# Patient Record
Sex: MALE | Race: WHITE | Employment: OTHER | URBAN - NONMETROPOLITAN AREA
[De-identification: names, ages, dates, MRNs, and addresses within clinical notes are randomized per-mention and may not be internally consistent; named-entity substitution may affect disease eponyms.]

---

## 2017-03-27 ENCOUNTER — HOSPITAL ENCOUNTER (OUTPATIENT)
Dept: PHARMACY | Age: 77
Setting detail: THERAPIES SERIES
Discharge: HOME | End: 2017-03-27
Payer: MEDICARE

## 2017-03-27 VITALS — SYSTOLIC BLOOD PRESSURE: 119 MMHG | HEART RATE: 59 BPM | DIASTOLIC BLOOD PRESSURE: 75 MMHG

## 2017-03-27 DIAGNOSIS — Z79.01 LONG-TERM (CURRENT) USE OF ANTICOAGULANTS: ICD-10-CM

## 2017-03-27 DIAGNOSIS — I48.91 ATRIAL FIBRILLATION, UNSPECIFIED TYPE (HCC): ICD-10-CM

## 2017-03-27 LAB — INR BLD: 2.3

## 2017-03-27 PROCEDURE — 85610 PROTHROMBIN TIME: CPT | Performed by: FAMILY MEDICINE

## 2017-03-27 PROCEDURE — G0463 HOSPITAL OUTPT CLINIC VISIT: HCPCS | Performed by: FAMILY MEDICINE

## 2017-04-26 ENCOUNTER — HOSPITAL ENCOUNTER (OUTPATIENT)
Dept: PHARMACY | Age: 77
Setting detail: THERAPIES SERIES
Discharge: HOME | End: 2017-04-26
Payer: MEDICARE

## 2017-04-26 VITALS
BODY MASS INDEX: 24.14 KG/M2 | DIASTOLIC BLOOD PRESSURE: 66 MMHG | SYSTOLIC BLOOD PRESSURE: 126 MMHG | WEIGHT: 183 LBS | HEART RATE: 51 BPM

## 2017-04-26 DIAGNOSIS — Z79.01 LONG-TERM (CURRENT) USE OF ANTICOAGULANTS: ICD-10-CM

## 2017-04-26 LAB — INR BLD: 1.9

## 2017-04-26 PROCEDURE — 85610 PROTHROMBIN TIME: CPT

## 2017-04-26 PROCEDURE — 99211 OFF/OP EST MAY X REQ PHY/QHP: CPT

## 2017-05-24 ENCOUNTER — APPOINTMENT (OUTPATIENT)
Dept: PHARMACY | Age: 77
End: 2017-05-24
Payer: MEDICARE

## 2017-05-31 ENCOUNTER — HOSPITAL ENCOUNTER (OUTPATIENT)
Dept: PHARMACY | Age: 77
Setting detail: THERAPIES SERIES
Discharge: HOME | End: 2017-05-31
Payer: MEDICARE

## 2017-05-31 VITALS
SYSTOLIC BLOOD PRESSURE: 112 MMHG | HEART RATE: 69 BPM | BODY MASS INDEX: 24.46 KG/M2 | DIASTOLIC BLOOD PRESSURE: 67 MMHG | WEIGHT: 185.4 LBS

## 2017-05-31 DIAGNOSIS — Z79.01 LONG-TERM (CURRENT) USE OF ANTICOAGULANTS: ICD-10-CM

## 2017-05-31 LAB — INR BLD: 2.3

## 2017-05-31 PROCEDURE — 99211 OFF/OP EST MAY X REQ PHY/QHP: CPT

## 2017-05-31 PROCEDURE — 85610 PROTHROMBIN TIME: CPT

## 2017-06-28 ENCOUNTER — HOSPITAL ENCOUNTER (OUTPATIENT)
Dept: PHARMACY | Age: 77
Setting detail: THERAPIES SERIES
Discharge: HOME | End: 2017-06-28
Payer: MEDICARE

## 2017-06-28 VITALS
HEART RATE: 68 BPM | SYSTOLIC BLOOD PRESSURE: 119 MMHG | WEIGHT: 185 LBS | BODY MASS INDEX: 24.41 KG/M2 | DIASTOLIC BLOOD PRESSURE: 65 MMHG

## 2017-06-28 DIAGNOSIS — Z79.01 LONG-TERM (CURRENT) USE OF ANTICOAGULANTS: ICD-10-CM

## 2017-06-28 LAB — INR BLD: 2.8

## 2017-06-28 PROCEDURE — 85610 PROTHROMBIN TIME: CPT

## 2017-06-28 PROCEDURE — 99211 OFF/OP EST MAY X REQ PHY/QHP: CPT

## 2017-08-09 ENCOUNTER — HOSPITAL ENCOUNTER (OUTPATIENT)
Dept: PHARMACY | Age: 77
Setting detail: THERAPIES SERIES
Discharge: HOME | End: 2017-08-09
Payer: MEDICARE

## 2017-08-09 VITALS
WEIGHT: 181 LBS | SYSTOLIC BLOOD PRESSURE: 112 MMHG | BODY MASS INDEX: 23.88 KG/M2 | DIASTOLIC BLOOD PRESSURE: 60 MMHG | HEART RATE: 56 BPM

## 2017-08-09 DIAGNOSIS — Z79.01 LONG-TERM (CURRENT) USE OF ANTICOAGULANTS: ICD-10-CM

## 2017-08-09 LAB — INR BLD: 2.4

## 2017-08-09 PROCEDURE — 99211 OFF/OP EST MAY X REQ PHY/QHP: CPT

## 2017-08-09 PROCEDURE — 85610 PROTHROMBIN TIME: CPT

## 2017-09-20 ENCOUNTER — HOSPITAL ENCOUNTER (OUTPATIENT)
Dept: PHARMACY | Age: 77
Setting detail: THERAPIES SERIES
Discharge: HOME | End: 2017-09-20
Payer: MEDICARE

## 2017-09-20 VITALS
DIASTOLIC BLOOD PRESSURE: 72 MMHG | HEART RATE: 79 BPM | WEIGHT: 182 LBS | BODY MASS INDEX: 24.01 KG/M2 | SYSTOLIC BLOOD PRESSURE: 130 MMHG

## 2017-09-20 DIAGNOSIS — Z79.01 LONG-TERM (CURRENT) USE OF ANTICOAGULANTS: ICD-10-CM

## 2017-09-20 LAB — INR BLD: 1.9

## 2017-09-20 PROCEDURE — 85610 PROTHROMBIN TIME: CPT

## 2017-09-20 PROCEDURE — 99211 OFF/OP EST MAY X REQ PHY/QHP: CPT

## 2017-11-01 ENCOUNTER — HOSPITAL ENCOUNTER (OUTPATIENT)
Dept: PHARMACY | Age: 77
Setting detail: THERAPIES SERIES
Discharge: HOME | End: 2017-11-01
Payer: MEDICARE

## 2017-11-01 VITALS
SYSTOLIC BLOOD PRESSURE: 118 MMHG | HEART RATE: 63 BPM | DIASTOLIC BLOOD PRESSURE: 70 MMHG | BODY MASS INDEX: 24.14 KG/M2 | WEIGHT: 183 LBS

## 2017-11-01 DIAGNOSIS — Z79.01 LONG-TERM (CURRENT) USE OF ANTICOAGULANTS: ICD-10-CM

## 2017-11-01 LAB — INR BLD: 2.3

## 2017-11-01 PROCEDURE — 99211 OFF/OP EST MAY X REQ PHY/QHP: CPT

## 2017-11-01 PROCEDURE — 85610 PROTHROMBIN TIME: CPT

## 2017-11-01 NOTE — PATIENT INSTRUCTIONS
Continue to monitor urine and stool. Continue to monitor for signs of bleeding. Return to clinic in 6 weeks.

## 2017-12-13 ENCOUNTER — HOSPITAL ENCOUNTER (OUTPATIENT)
Dept: PHARMACY | Age: 77
Setting detail: THERAPIES SERIES
Discharge: HOME | End: 2017-12-13
Payer: MEDICARE

## 2017-12-13 VITALS
BODY MASS INDEX: 23.62 KG/M2 | HEART RATE: 73 BPM | WEIGHT: 179 LBS | SYSTOLIC BLOOD PRESSURE: 117 MMHG | DIASTOLIC BLOOD PRESSURE: 88 MMHG

## 2017-12-13 DIAGNOSIS — Z79.01 LONG-TERM (CURRENT) USE OF ANTICOAGULANTS: ICD-10-CM

## 2017-12-13 LAB — INR BLD: 1.9

## 2017-12-13 PROCEDURE — 85610 PROTHROMBIN TIME: CPT

## 2017-12-13 PROCEDURE — 99211 OFF/OP EST MAY X REQ PHY/QHP: CPT

## 2017-12-13 NOTE — PATIENT INSTRUCTIONS
Continue to monitor urine and stool. Continue to monitor for signs of bleeding. Return to clinic in 6 weeks. Take 8 mg of warfarin today.

## 2018-01-26 ENCOUNTER — HOSPITAL ENCOUNTER (OUTPATIENT)
Dept: PHARMACY | Age: 78
Setting detail: THERAPIES SERIES
Discharge: HOME | End: 2018-01-26
Payer: MEDICARE

## 2018-01-26 VITALS
WEIGHT: 185 LBS | DIASTOLIC BLOOD PRESSURE: 74 MMHG | SYSTOLIC BLOOD PRESSURE: 105 MMHG | BODY MASS INDEX: 24.41 KG/M2 | HEART RATE: 67 BPM

## 2018-01-26 DIAGNOSIS — Z79.01 LONG-TERM (CURRENT) USE OF ANTICOAGULANTS: ICD-10-CM

## 2018-01-26 LAB — INR BLD: 2.3

## 2018-01-26 PROCEDURE — 85610 PROTHROMBIN TIME: CPT

## 2018-01-26 PROCEDURE — 99211 OFF/OP EST MAY X REQ PHY/QHP: CPT

## 2018-03-01 ENCOUNTER — HOSPITAL ENCOUNTER (OUTPATIENT)
Dept: PHARMACY | Age: 78
Setting detail: THERAPIES SERIES
Discharge: HOME | End: 2018-03-01
Payer: MEDICARE

## 2018-03-01 VITALS
BODY MASS INDEX: 24.22 KG/M2 | WEIGHT: 183.6 LBS | HEART RATE: 61 BPM | DIASTOLIC BLOOD PRESSURE: 80 MMHG | SYSTOLIC BLOOD PRESSURE: 116 MMHG

## 2018-03-01 DIAGNOSIS — Z79.01 LONG-TERM (CURRENT) USE OF ANTICOAGULANTS: ICD-10-CM

## 2018-03-01 DIAGNOSIS — I48.91 ATRIAL FIBRILLATION, UNSPECIFIED TYPE (HCC): ICD-10-CM

## 2018-03-01 LAB — INR BLD: 1.8

## 2018-03-01 PROCEDURE — 99211 OFF/OP EST MAY X REQ PHY/QHP: CPT | Performed by: FAMILY MEDICINE

## 2018-03-01 PROCEDURE — 85610 PROTHROMBIN TIME: CPT | Performed by: FAMILY MEDICINE

## 2018-03-01 NOTE — PATIENT INSTRUCTIONS
Please take 10mg wafarin today then return to your previously stable dosing. Continue to monitor for signs of bleeding. Return to coumadin clinic in 6 weeks.

## 2018-03-01 NOTE — PROGRESS NOTES
Fingerstick INR drawn per clinic protocol. Patient states no visible blood in urine and no black tarry stool. Denies any missed doses of warfarin. No change in other maintenance medications or in diet. Will continue current warfarin regimen and recheck INR in 6 week(s). Patient missed 2 doses about 2 weeks ago for a tooth extraction. He was instructed by Dr Lizette Green to hold medication evening prior and cut is dose the night of the procedure. Patient will take warfarin 10mg to day then resume previously stable dosing. Patient is leaving for 2 weeks in [place] tomorrow.

## 2018-04-11 ENCOUNTER — HOSPITAL ENCOUNTER (OUTPATIENT)
Dept: PHARMACY | Age: 78
Setting detail: THERAPIES SERIES
Discharge: HOME | End: 2018-04-11
Payer: MEDICARE

## 2018-04-11 DIAGNOSIS — I48.91 ATRIAL FIBRILLATION, UNSPECIFIED TYPE (HCC): ICD-10-CM

## 2018-04-11 DIAGNOSIS — Z79.01 LONG-TERM (CURRENT) USE OF ANTICOAGULANTS: ICD-10-CM

## 2018-04-11 LAB — INR BLD: 2.1

## 2018-04-11 PROCEDURE — 99211 OFF/OP EST MAY X REQ PHY/QHP: CPT | Performed by: FAMILY MEDICINE

## 2018-04-11 PROCEDURE — 85610 PROTHROMBIN TIME: CPT | Performed by: FAMILY MEDICINE

## 2018-05-24 ENCOUNTER — HOSPITAL ENCOUNTER (OUTPATIENT)
Dept: PHARMACY | Age: 78
Setting detail: THERAPIES SERIES
Discharge: HOME | End: 2018-05-24
Payer: MEDICARE

## 2018-05-24 VITALS
HEART RATE: 78 BPM | BODY MASS INDEX: 24.14 KG/M2 | WEIGHT: 183 LBS | DIASTOLIC BLOOD PRESSURE: 71 MMHG | SYSTOLIC BLOOD PRESSURE: 119 MMHG

## 2018-05-24 DIAGNOSIS — I48.91 ATRIAL FIBRILLATION, UNSPECIFIED TYPE (HCC): ICD-10-CM

## 2018-05-24 DIAGNOSIS — Z79.01 LONG-TERM (CURRENT) USE OF ANTICOAGULANTS: ICD-10-CM

## 2018-05-24 LAB — INR BLD: 2

## 2018-05-24 PROCEDURE — 85610 PROTHROMBIN TIME: CPT

## 2018-05-24 PROCEDURE — 99211 OFF/OP EST MAY X REQ PHY/QHP: CPT

## 2018-06-28 ENCOUNTER — HOSPITAL ENCOUNTER (OUTPATIENT)
Dept: PHARMACY | Age: 78
Setting detail: THERAPIES SERIES
Discharge: HOME | End: 2018-06-28
Payer: MEDICARE

## 2018-06-28 VITALS — HEART RATE: 56 BPM | DIASTOLIC BLOOD PRESSURE: 74 MMHG | SYSTOLIC BLOOD PRESSURE: 122 MMHG

## 2018-06-28 DIAGNOSIS — I48.91 ATRIAL FIBRILLATION, UNSPECIFIED TYPE (HCC): ICD-10-CM

## 2018-06-28 DIAGNOSIS — Z79.01 LONG-TERM (CURRENT) USE OF ANTICOAGULANTS: ICD-10-CM

## 2018-06-28 LAB — INR BLD: 2

## 2018-06-28 PROCEDURE — 85610 PROTHROMBIN TIME: CPT

## 2018-06-28 PROCEDURE — 99211 OFF/OP EST MAY X REQ PHY/QHP: CPT

## 2018-07-26 ENCOUNTER — HOSPITAL ENCOUNTER (OUTPATIENT)
Dept: CARDIAC REHAB | Age: 78
Setting detail: THERAPIES SERIES
Discharge: HOME | End: 2018-07-26
Payer: MEDICARE

## 2018-08-01 ENCOUNTER — HOSPITAL ENCOUNTER (OUTPATIENT)
Age: 78
Discharge: HOME | End: 2018-08-03
Payer: MEDICARE

## 2018-08-01 ENCOUNTER — HOSPITAL ENCOUNTER (OUTPATIENT)
Dept: GENERAL RADIOLOGY | Age: 78
Discharge: HOME | End: 2018-08-03
Payer: MEDICARE

## 2018-08-01 DIAGNOSIS — J30.9 ALLERGIC RHINITIS, UNSPECIFIED CHRONICITY, UNSPECIFIED SEASONALITY, UNSPECIFIED TRIGGER: ICD-10-CM

## 2018-08-01 PROCEDURE — 71046 X-RAY EXAM CHEST 2 VIEWS: CPT

## 2018-08-02 ENCOUNTER — HOSPITAL ENCOUNTER (OUTPATIENT)
Dept: CARDIAC REHAB | Age: 78
Setting detail: THERAPIES SERIES
Discharge: HOME | End: 2018-08-02
Payer: MEDICARE

## 2018-08-06 ENCOUNTER — HOSPITAL ENCOUNTER (OUTPATIENT)
Dept: CARDIAC REHAB | Age: 78
Setting detail: THERAPIES SERIES
Discharge: HOME | End: 2018-08-06
Payer: MEDICARE

## 2018-08-08 ENCOUNTER — HOSPITAL ENCOUNTER (OUTPATIENT)
Dept: CARDIAC REHAB | Age: 78
Setting detail: THERAPIES SERIES
Discharge: HOME | End: 2018-08-08
Payer: MEDICARE

## 2018-08-09 ENCOUNTER — HOSPITAL ENCOUNTER (OUTPATIENT)
Dept: PHARMACY | Age: 78
Setting detail: THERAPIES SERIES
Discharge: HOME | End: 2018-08-09
Payer: MEDICARE

## 2018-08-09 ENCOUNTER — HOSPITAL ENCOUNTER (OUTPATIENT)
Dept: CARDIAC REHAB | Age: 78
Setting detail: THERAPIES SERIES
Discharge: HOME | End: 2018-08-09
Payer: MEDICARE

## 2018-08-09 VITALS
BODY MASS INDEX: 23.75 KG/M2 | HEART RATE: 72 BPM | SYSTOLIC BLOOD PRESSURE: 144 MMHG | DIASTOLIC BLOOD PRESSURE: 83 MMHG | WEIGHT: 180 LBS

## 2018-08-09 DIAGNOSIS — Z79.01 LONG TERM CURRENT USE OF ANTICOAGULANT THERAPY: ICD-10-CM

## 2018-08-09 LAB — INR BLD: 1.8

## 2018-08-09 PROCEDURE — 85610 PROTHROMBIN TIME: CPT

## 2018-08-09 PROCEDURE — 99211 OFF/OP EST MAY X REQ PHY/QHP: CPT

## 2018-08-20 ENCOUNTER — HOSPITAL ENCOUNTER (OUTPATIENT)
Dept: CARDIAC REHAB | Age: 78
Setting detail: THERAPIES SERIES
Discharge: HOME | End: 2018-08-20
Payer: MEDICARE

## 2018-08-21 ENCOUNTER — HOSPITAL ENCOUNTER (OUTPATIENT)
Dept: PHARMACY | Age: 78
Setting detail: THERAPIES SERIES
Discharge: HOME | End: 2018-08-21
Payer: MEDICARE

## 2018-08-21 VITALS
BODY MASS INDEX: 24.17 KG/M2 | DIASTOLIC BLOOD PRESSURE: 75 MMHG | HEART RATE: 64 BPM | WEIGHT: 183.2 LBS | SYSTOLIC BLOOD PRESSURE: 130 MMHG

## 2018-08-21 DIAGNOSIS — I48.20 CHRONIC ATRIAL FIBRILLATION (HCC): ICD-10-CM

## 2018-08-21 DIAGNOSIS — Z79.01 LONG TERM CURRENT USE OF ANTICOAGULANT THERAPY: ICD-10-CM

## 2018-08-21 LAB — INR BLD: 2.3

## 2018-08-21 PROCEDURE — 85610 PROTHROMBIN TIME: CPT

## 2018-08-21 PROCEDURE — 99211 OFF/OP EST MAY X REQ PHY/QHP: CPT

## 2018-08-21 NOTE — PATIENT INSTRUCTIONS
Continue current dose of warfarin as instructed on dosing calendar provided. Continue to monitor urine and stool for signs and symptoms of bleeding. Please notify the clinic of any medication changes.

## 2018-08-22 ENCOUNTER — HOSPITAL ENCOUNTER (OUTPATIENT)
Dept: CARDIAC REHAB | Age: 78
Setting detail: THERAPIES SERIES
Discharge: HOME | End: 2018-08-22
Payer: MEDICARE

## 2018-08-27 ENCOUNTER — HOSPITAL ENCOUNTER (OUTPATIENT)
Dept: CARDIAC REHAB | Age: 78
Setting detail: THERAPIES SERIES
Discharge: HOME | End: 2018-08-27
Payer: MEDICARE

## 2018-08-28 ENCOUNTER — APPOINTMENT (OUTPATIENT)
Dept: PHARMACY | Age: 78
End: 2018-08-28
Payer: MEDICARE

## 2018-08-29 ENCOUNTER — HOSPITAL ENCOUNTER (OUTPATIENT)
Dept: PHARMACY | Age: 78
Setting detail: THERAPIES SERIES
Discharge: HOME | End: 2018-08-29
Payer: MEDICARE

## 2018-08-29 ENCOUNTER — HOSPITAL ENCOUNTER (OUTPATIENT)
Dept: CARDIAC REHAB | Age: 78
Setting detail: THERAPIES SERIES
Discharge: HOME | End: 2018-08-29
Payer: MEDICARE

## 2018-08-29 VITALS
WEIGHT: 181 LBS | BODY MASS INDEX: 23.88 KG/M2 | SYSTOLIC BLOOD PRESSURE: 151 MMHG | HEART RATE: 73 BPM | DIASTOLIC BLOOD PRESSURE: 79 MMHG

## 2018-08-29 DIAGNOSIS — Z79.01 LONG TERM CURRENT USE OF ANTICOAGULANT THERAPY: ICD-10-CM

## 2018-08-29 LAB — INR BLD: 3

## 2018-08-29 PROCEDURE — 85610 PROTHROMBIN TIME: CPT

## 2018-08-29 PROCEDURE — 99211 OFF/OP EST MAY X REQ PHY/QHP: CPT

## 2018-08-29 NOTE — PROGRESS NOTES
Patient states no visible blood in urine and no black tarry stool. No change in other medications. Will return to clinic in 1 week. Request for today's INR to be faxed to Dr. Mary Jo Garibay. Will continue current warfarin regimen and recheck INR in 1 week.   LINN and cardioversion tomorrow with Dr Ary Saunders  INR result faxed to Dr. Mary Jo Garibay at [phone].

## 2018-09-05 ENCOUNTER — HOSPITAL ENCOUNTER (OUTPATIENT)
Dept: PHARMACY | Age: 78
Setting detail: THERAPIES SERIES
Discharge: HOME | End: 2018-09-05
Payer: MEDICARE

## 2018-09-05 VITALS
SYSTOLIC BLOOD PRESSURE: 117 MMHG | WEIGHT: 183 LBS | DIASTOLIC BLOOD PRESSURE: 67 MMHG | HEART RATE: 114 BPM | BODY MASS INDEX: 24.14 KG/M2

## 2018-09-05 DIAGNOSIS — Z79.01 LONG TERM CURRENT USE OF ANTICOAGULANT THERAPY: ICD-10-CM

## 2018-09-05 LAB — INR BLD: 2.9

## 2018-09-05 PROCEDURE — 85610 PROTHROMBIN TIME: CPT

## 2018-09-05 PROCEDURE — 99211 OFF/OP EST MAY X REQ PHY/QHP: CPT

## 2018-09-05 NOTE — PROGRESS NOTES
Patient states no visible blood in urine and no black tarry stool. No change in other medications. Will return to clinic in 1 week. Patient started metoprolol 25 mg po BID. Patient went for cardioversion on 8/30/18 and ended up with pacemaker.

## 2018-09-06 ENCOUNTER — APPOINTMENT (OUTPATIENT)
Dept: PHARMACY | Age: 78
End: 2018-09-06
Payer: MEDICARE

## 2018-09-13 ENCOUNTER — HOSPITAL ENCOUNTER (OUTPATIENT)
Dept: PHARMACY | Age: 78
Setting detail: THERAPIES SERIES
Discharge: HOME | End: 2018-09-13
Payer: MEDICARE

## 2018-09-13 ENCOUNTER — HOSPITAL ENCOUNTER (OUTPATIENT)
Dept: CARDIAC REHAB | Age: 78
Setting detail: THERAPIES SERIES
Discharge: HOME | End: 2018-09-13
Payer: MEDICARE

## 2018-09-13 VITALS — DIASTOLIC BLOOD PRESSURE: 78 MMHG | HEART RATE: 62 BPM | SYSTOLIC BLOOD PRESSURE: 123 MMHG

## 2018-09-13 DIAGNOSIS — Z79.01 LONG TERM CURRENT USE OF ANTICOAGULANT THERAPY: ICD-10-CM

## 2018-09-13 DIAGNOSIS — I48.91 ATRIAL FIBRILLATION, UNSPECIFIED TYPE (HCC): ICD-10-CM

## 2018-09-13 LAB — INR BLD: 3.9

## 2018-09-13 PROCEDURE — 85610 PROTHROMBIN TIME: CPT | Performed by: FAMILY MEDICINE

## 2018-09-13 PROCEDURE — 99212 OFFICE O/P EST SF 10 MIN: CPT | Performed by: FAMILY MEDICINE

## 2018-09-13 NOTE — PATIENT INSTRUCTIONS
Do not take warfarin today then decrease your weekly dosage as instructed. Continue to monitor for signs of bleeding. Return to coumadin clinic in 1 week.

## 2018-09-24 ENCOUNTER — HOSPITAL ENCOUNTER (OUTPATIENT)
Dept: PHARMACY | Age: 78
Setting detail: THERAPIES SERIES
Discharge: HOME | End: 2018-09-24
Payer: MEDICARE

## 2018-09-24 VITALS — SYSTOLIC BLOOD PRESSURE: 126 MMHG | HEART RATE: 67 BPM | DIASTOLIC BLOOD PRESSURE: 69 MMHG

## 2018-09-24 DIAGNOSIS — I48.20 CHRONIC ATRIAL FIBRILLATION (HCC): ICD-10-CM

## 2018-09-24 DIAGNOSIS — Z79.01 LONG TERM CURRENT USE OF ANTICOAGULANT THERAPY: ICD-10-CM

## 2018-09-24 LAB — INR BLD: 2.4

## 2018-09-24 PROCEDURE — 85610 PROTHROMBIN TIME: CPT

## 2018-09-24 PROCEDURE — 99211 OFF/OP EST MAY X REQ PHY/QHP: CPT

## 2018-09-24 NOTE — PROGRESS NOTES
Fingerstick INR drawn per clinic protocol. Patient states no visible blood in urine and no black tarry stool. Denies any missed doses of warfarin. No change in other maintenance medications or in diet. Patient states he had cardioversion 8/30/18 that lasted 90 minutes, and he ended up with a pacemaker. Patient has appointment with cardiologist on Wednesday, 9/26/18, and will discuss a-fib treatment options. Will continue current warfarin regimen and recheck INR in 2 week(s). Patient acknowledges working in consult agreement with pharmacist as referred by his/her physician.

## 2018-10-10 ENCOUNTER — HOSPITAL ENCOUNTER (OUTPATIENT)
Dept: PHARMACY | Age: 78
Setting detail: THERAPIES SERIES
Discharge: HOME | End: 2018-10-10
Payer: MEDICARE

## 2018-10-10 VITALS
DIASTOLIC BLOOD PRESSURE: 79 MMHG | HEART RATE: 74 BPM | BODY MASS INDEX: 24.01 KG/M2 | SYSTOLIC BLOOD PRESSURE: 116 MMHG | WEIGHT: 182 LBS

## 2018-10-10 DIAGNOSIS — Z79.01 LONG TERM CURRENT USE OF ANTICOAGULANT THERAPY: ICD-10-CM

## 2018-10-10 LAB — INR BLD: 1.9

## 2018-10-10 PROCEDURE — 85610 PROTHROMBIN TIME: CPT

## 2018-10-10 PROCEDURE — 99211 OFF/OP EST MAY X REQ PHY/QHP: CPT

## 2018-10-10 RX ORDER — SOTALOL HYDROCHLORIDE 80 MG/1
80 TABLET ORAL DAILY
COMMUNITY
End: 2019-04-19

## 2018-10-17 ENCOUNTER — HOSPITAL ENCOUNTER (OUTPATIENT)
Dept: CARDIAC REHAB | Age: 78
Discharge: HOME | End: 2018-10-17
Payer: MEDICARE

## 2018-10-17 ENCOUNTER — HOSPITAL ENCOUNTER (OUTPATIENT)
Dept: PHARMACY | Age: 78
Setting detail: THERAPIES SERIES
Discharge: HOME | End: 2018-10-17
Payer: MEDICARE

## 2018-10-17 VITALS
HEART RATE: 71 BPM | SYSTOLIC BLOOD PRESSURE: 125 MMHG | WEIGHT: 181 LBS | DIASTOLIC BLOOD PRESSURE: 71 MMHG | BODY MASS INDEX: 23.88 KG/M2

## 2018-10-17 DIAGNOSIS — Z79.01 LONG TERM CURRENT USE OF ANTICOAGULANT THERAPY: ICD-10-CM

## 2018-10-17 LAB — INR BLD: 2

## 2018-10-17 PROCEDURE — 99211 OFF/OP EST MAY X REQ PHY/QHP: CPT

## 2018-10-17 PROCEDURE — 85610 PROTHROMBIN TIME: CPT

## 2018-10-17 NOTE — PATIENT INSTRUCTIONS
Continue to monitor urine and stool. Continue to monitor for signs of bleeding. Return to clinic in 1 week. Increase warfarin dosage slightly to 6 mg M,W,F and 8 mg all other days.

## 2018-10-22 ENCOUNTER — HOSPITAL ENCOUNTER (OUTPATIENT)
Dept: CARDIAC REHAB | Age: 78
Discharge: HOME | End: 2018-10-22
Payer: MEDICARE

## 2018-10-24 ENCOUNTER — APPOINTMENT (OUTPATIENT)
Dept: PHARMACY | Age: 78
End: 2018-10-24
Payer: MEDICARE

## 2018-10-24 ENCOUNTER — HOSPITAL ENCOUNTER (OUTPATIENT)
Dept: CARDIAC REHAB | Age: 78
Discharge: HOME | End: 2018-10-24
Payer: MEDICARE

## 2018-10-24 ENCOUNTER — HOSPITAL ENCOUNTER (OUTPATIENT)
Dept: PHARMACY | Age: 78
Setting detail: THERAPIES SERIES
Discharge: HOME | End: 2018-10-24
Payer: MEDICARE

## 2018-10-24 VITALS
HEART RATE: 66 BPM | DIASTOLIC BLOOD PRESSURE: 69 MMHG | SYSTOLIC BLOOD PRESSURE: 127 MMHG | BODY MASS INDEX: 23.88 KG/M2 | WEIGHT: 181 LBS

## 2018-10-24 DIAGNOSIS — Z79.01 LONG TERM CURRENT USE OF ANTICOAGULANT THERAPY: ICD-10-CM

## 2018-10-24 LAB — INR BLD: 2.4

## 2018-10-24 PROCEDURE — 99211 OFF/OP EST MAY X REQ PHY/QHP: CPT

## 2018-10-24 PROCEDURE — 85610 PROTHROMBIN TIME: CPT

## 2018-10-25 ENCOUNTER — HOSPITAL ENCOUNTER (OUTPATIENT)
Dept: CARDIAC REHAB | Age: 78
Discharge: HOME | End: 2018-10-25
Payer: MEDICARE

## 2018-10-25 PROCEDURE — 9900000059 HC CARDIAC REHAB PHASE 3 - 12 VISITS

## 2018-10-29 ENCOUNTER — HOSPITAL ENCOUNTER (OUTPATIENT)
Dept: CARDIAC REHAB | Age: 78
Discharge: HOME | End: 2018-10-29
Payer: MEDICARE

## 2018-10-31 ENCOUNTER — HOSPITAL ENCOUNTER (OUTPATIENT)
Dept: PHARMACY | Age: 78
Setting detail: THERAPIES SERIES
Discharge: HOME | End: 2018-10-31
Payer: MEDICARE

## 2018-10-31 ENCOUNTER — HOSPITAL ENCOUNTER (OUTPATIENT)
Dept: CARDIAC REHAB | Age: 78
Discharge: HOME | End: 2018-10-31
Payer: MEDICARE

## 2018-10-31 VITALS — WEIGHT: 182.2 LBS | BODY MASS INDEX: 24.04 KG/M2

## 2018-10-31 DIAGNOSIS — Z79.01 LONG TERM CURRENT USE OF ANTICOAGULANT THERAPY: ICD-10-CM

## 2018-10-31 DIAGNOSIS — I48.91 ATRIAL FIBRILLATION, UNSPECIFIED TYPE (HCC): ICD-10-CM

## 2018-10-31 LAB — INR BLD: 2.3

## 2018-10-31 PROCEDURE — 99211 OFF/OP EST MAY X REQ PHY/QHP: CPT | Performed by: FAMILY MEDICINE

## 2018-10-31 PROCEDURE — 85610 PROTHROMBIN TIME: CPT | Performed by: FAMILY MEDICINE

## 2018-11-01 ENCOUNTER — HOSPITAL ENCOUNTER (OUTPATIENT)
Dept: CARDIAC REHAB | Age: 78
Discharge: HOME | End: 2018-11-01
Payer: MEDICARE

## 2018-11-05 ENCOUNTER — HOSPITAL ENCOUNTER (OUTPATIENT)
Dept: CARDIAC REHAB | Age: 78
Discharge: HOME | End: 2018-11-05
Payer: MEDICARE

## 2018-11-07 ENCOUNTER — HOSPITAL ENCOUNTER (OUTPATIENT)
Dept: CARDIAC REHAB | Age: 78
Discharge: HOME | End: 2018-11-07
Payer: MEDICARE

## 2018-11-07 ENCOUNTER — HOSPITAL ENCOUNTER (OUTPATIENT)
Dept: PHARMACY | Age: 78
Setting detail: THERAPIES SERIES
Discharge: HOME | End: 2018-11-07
Payer: MEDICARE

## 2018-11-07 VITALS
WEIGHT: 184 LBS | BODY MASS INDEX: 24.28 KG/M2 | SYSTOLIC BLOOD PRESSURE: 118 MMHG | DIASTOLIC BLOOD PRESSURE: 82 MMHG | HEART RATE: 71 BPM

## 2018-11-07 DIAGNOSIS — Z79.01 LONG TERM CURRENT USE OF ANTICOAGULANT THERAPY: ICD-10-CM

## 2018-11-07 DIAGNOSIS — I48.91 ATRIAL FIBRILLATION, UNSPECIFIED TYPE (HCC): ICD-10-CM

## 2018-11-07 LAB — INR BLD: 2

## 2018-11-07 PROCEDURE — 85610 PROTHROMBIN TIME: CPT | Performed by: FAMILY MEDICINE

## 2018-11-07 PROCEDURE — 99211 OFF/OP EST MAY X REQ PHY/QHP: CPT | Performed by: FAMILY MEDICINE

## 2018-11-12 ENCOUNTER — HOSPITAL ENCOUNTER (OUTPATIENT)
Dept: CARDIAC REHAB | Age: 78
Discharge: HOME | End: 2018-11-12
Payer: MEDICARE

## 2018-11-14 ENCOUNTER — HOSPITAL ENCOUNTER (OUTPATIENT)
Age: 78
Discharge: HOME | End: 2018-11-16
Payer: MEDICARE

## 2018-11-14 ENCOUNTER — HOSPITAL ENCOUNTER (OUTPATIENT)
Dept: CARDIAC REHAB | Age: 78
Discharge: HOME | End: 2018-11-14
Payer: MEDICARE

## 2018-11-14 ENCOUNTER — HOSPITAL ENCOUNTER (OUTPATIENT)
Dept: GENERAL RADIOLOGY | Age: 78
Discharge: HOME | End: 2018-11-16
Payer: MEDICARE

## 2018-11-14 DIAGNOSIS — M54.2 CERVICALGIA: ICD-10-CM

## 2018-11-14 PROCEDURE — 72050 X-RAY EXAM NECK SPINE 4/5VWS: CPT

## 2018-11-19 ENCOUNTER — HOSPITAL ENCOUNTER (OUTPATIENT)
Dept: CARDIAC REHAB | Age: 78
Discharge: HOME | End: 2018-11-19
Payer: MEDICARE

## 2018-11-26 ENCOUNTER — HOSPITAL ENCOUNTER (OUTPATIENT)
Dept: CARDIAC REHAB | Age: 78
Discharge: HOME | End: 2018-11-26
Payer: MEDICARE

## 2018-11-28 ENCOUNTER — HOSPITAL ENCOUNTER (OUTPATIENT)
Dept: CARDIAC REHAB | Age: 78
Discharge: HOME | End: 2018-11-28
Payer: MEDICARE

## 2018-11-29 ENCOUNTER — HOSPITAL ENCOUNTER (OUTPATIENT)
Dept: CARDIAC REHAB | Age: 78
Discharge: HOME | End: 2018-11-29
Payer: MEDICARE

## 2018-12-03 ENCOUNTER — HOSPITAL ENCOUNTER (OUTPATIENT)
Dept: CARDIAC REHAB | Age: 78
Discharge: HOME | End: 2018-12-03
Payer: MEDICARE

## 2018-12-05 ENCOUNTER — HOSPITAL ENCOUNTER (OUTPATIENT)
Dept: PHARMACY | Age: 78
Setting detail: THERAPIES SERIES
Discharge: HOME | End: 2018-12-05
Payer: MEDICARE

## 2018-12-05 ENCOUNTER — HOSPITAL ENCOUNTER (OUTPATIENT)
Dept: CARDIAC REHAB | Age: 78
Discharge: HOME | End: 2018-12-05
Payer: MEDICARE

## 2018-12-05 VITALS
BODY MASS INDEX: 24.41 KG/M2 | HEART RATE: 74 BPM | DIASTOLIC BLOOD PRESSURE: 82 MMHG | WEIGHT: 185 LBS | SYSTOLIC BLOOD PRESSURE: 118 MMHG

## 2018-12-05 DIAGNOSIS — Z79.01 LONG TERM CURRENT USE OF ANTICOAGULANT THERAPY: ICD-10-CM

## 2018-12-05 LAB — INR BLD: 2.4

## 2018-12-05 PROCEDURE — 85610 PROTHROMBIN TIME: CPT

## 2018-12-05 PROCEDURE — 99211 OFF/OP EST MAY X REQ PHY/QHP: CPT

## 2018-12-05 NOTE — PROGRESS NOTES
Dr Donita Holstein at [facility] will do ablation on Giuseppe 3, 2019. Patient states no visible blood in urine and no black tarry stool. No change in other medications. Will return to clinic in 2 weeks.

## 2018-12-10 ENCOUNTER — HOSPITAL ENCOUNTER (OUTPATIENT)
Dept: CARDIAC REHAB | Age: 78
Discharge: HOME | End: 2018-12-10
Payer: MEDICARE

## 2018-12-12 ENCOUNTER — HOSPITAL ENCOUNTER (OUTPATIENT)
Dept: CARDIAC REHAB | Age: 78
Discharge: HOME | End: 2018-12-12
Payer: MEDICARE

## 2018-12-17 ENCOUNTER — HOSPITAL ENCOUNTER (OUTPATIENT)
Dept: CARDIAC REHAB | Age: 78
Discharge: HOME | End: 2018-12-17
Payer: MEDICARE

## 2018-12-19 ENCOUNTER — HOSPITAL ENCOUNTER (OUTPATIENT)
Dept: CARDIAC REHAB | Age: 78
Discharge: HOME | End: 2018-12-19
Payer: MEDICARE

## 2018-12-19 ENCOUNTER — TELEPHONE (OUTPATIENT)
Dept: PHARMACY | Age: 78
End: 2018-12-19

## 2018-12-19 ENCOUNTER — HOSPITAL ENCOUNTER (OUTPATIENT)
Dept: PHARMACY | Age: 78
Setting detail: THERAPIES SERIES
Discharge: HOME | End: 2018-12-19
Payer: MEDICARE

## 2018-12-19 VITALS
HEART RATE: 77 BPM | WEIGHT: 186 LBS | DIASTOLIC BLOOD PRESSURE: 82 MMHG | BODY MASS INDEX: 24.54 KG/M2 | SYSTOLIC BLOOD PRESSURE: 134 MMHG

## 2018-12-19 DIAGNOSIS — Z79.01 LONG TERM CURRENT USE OF ANTICOAGULANT THERAPY: ICD-10-CM

## 2018-12-19 LAB — INR BLD: 1.9

## 2018-12-19 PROCEDURE — 85610 PROTHROMBIN TIME: CPT

## 2018-12-19 PROCEDURE — 99211 OFF/OP EST MAY X REQ PHY/QHP: CPT

## 2018-12-20 ENCOUNTER — HOSPITAL ENCOUNTER (OUTPATIENT)
Dept: CARDIAC REHAB | Age: 78
Discharge: HOME | End: 2018-12-20
Payer: MEDICARE

## 2018-12-28 ENCOUNTER — HOSPITAL ENCOUNTER (OUTPATIENT)
Dept: PHARMACY | Age: 78
Setting detail: THERAPIES SERIES
Discharge: HOME | End: 2018-12-28
Payer: MEDICARE

## 2018-12-28 VITALS
SYSTOLIC BLOOD PRESSURE: 138 MMHG | DIASTOLIC BLOOD PRESSURE: 89 MMHG | WEIGHT: 189 LBS | BODY MASS INDEX: 24.94 KG/M2 | HEART RATE: 73 BPM

## 2018-12-28 DIAGNOSIS — Z79.01 LONG TERM CURRENT USE OF ANTICOAGULANT THERAPY: ICD-10-CM

## 2018-12-28 DIAGNOSIS — I48.91 ATRIAL FIBRILLATION, UNSPECIFIED TYPE (HCC): ICD-10-CM

## 2018-12-28 LAB — INR BLD: 1.8

## 2018-12-28 PROCEDURE — 99211 OFF/OP EST MAY X REQ PHY/QHP: CPT

## 2018-12-28 PROCEDURE — 85610 PROTHROMBIN TIME: CPT

## 2018-12-28 NOTE — PROGRESS NOTES
Patient will be having a LINN on 1/2 and an ablation on 1/3. Will have patient take 8 mg of warfarin today and recheck INR on Monday, as scheduled. Patient states no visible blood in urine and no black tarry stool. No change in other medications.

## 2018-12-31 ENCOUNTER — HOSPITAL ENCOUNTER (OUTPATIENT)
Dept: CARDIAC REHAB | Age: 78
Discharge: HOME | End: 2018-12-31
Payer: MEDICARE

## 2018-12-31 ENCOUNTER — HOSPITAL ENCOUNTER (OUTPATIENT)
Dept: PHARMACY | Age: 78
Setting detail: THERAPIES SERIES
Discharge: HOME | End: 2018-12-31
Payer: MEDICARE

## 2018-12-31 VITALS — BODY MASS INDEX: 24.75 KG/M2 | WEIGHT: 187.6 LBS

## 2018-12-31 DIAGNOSIS — Z79.01 LONG TERM CURRENT USE OF ANTICOAGULANT THERAPY: ICD-10-CM

## 2018-12-31 DIAGNOSIS — I48.91 ATRIAL FIBRILLATION, UNSPECIFIED TYPE (HCC): ICD-10-CM

## 2018-12-31 LAB — INR BLD: 2.3

## 2018-12-31 PROCEDURE — 99211 OFF/OP EST MAY X REQ PHY/QHP: CPT | Performed by: FAMILY MEDICINE

## 2018-12-31 PROCEDURE — 85610 PROTHROMBIN TIME: CPT | Performed by: FAMILY MEDICINE

## 2019-01-09 ENCOUNTER — HOSPITAL ENCOUNTER (OUTPATIENT)
Dept: PHARMACY | Age: 79
Setting detail: THERAPIES SERIES
Discharge: HOME | End: 2019-01-09
Payer: MEDICARE

## 2019-01-09 VITALS
WEIGHT: 191 LBS | HEART RATE: 84 BPM | BODY MASS INDEX: 25.2 KG/M2 | SYSTOLIC BLOOD PRESSURE: 137 MMHG | DIASTOLIC BLOOD PRESSURE: 77 MMHG

## 2019-01-09 DIAGNOSIS — Z79.01 LONG TERM CURRENT USE OF ANTICOAGULANT THERAPY: ICD-10-CM

## 2019-01-09 LAB — INR BLD: 2.2

## 2019-01-09 PROCEDURE — 99211 OFF/OP EST MAY X REQ PHY/QHP: CPT

## 2019-01-09 PROCEDURE — 85610 PROTHROMBIN TIME: CPT

## 2019-01-16 ENCOUNTER — HOSPITAL ENCOUNTER (OUTPATIENT)
Dept: CARDIAC REHAB | Age: 79
Setting detail: THERAPIES SERIES
Discharge: HOME | End: 2019-01-16
Payer: MEDICARE

## 2019-01-17 ENCOUNTER — HOSPITAL ENCOUNTER (OUTPATIENT)
Dept: CARDIAC REHAB | Age: 79
Setting detail: THERAPIES SERIES
Discharge: HOME | End: 2019-01-17
Payer: MEDICARE

## 2019-01-23 ENCOUNTER — HOSPITAL ENCOUNTER (OUTPATIENT)
Dept: CARDIAC REHAB | Age: 79
Setting detail: THERAPIES SERIES
Discharge: HOME | End: 2019-01-23
Payer: MEDICARE

## 2019-01-23 ENCOUNTER — HOSPITAL ENCOUNTER (OUTPATIENT)
Dept: PHARMACY | Age: 79
Setting detail: THERAPIES SERIES
Discharge: HOME | End: 2019-01-23
Payer: MEDICARE

## 2019-01-23 DIAGNOSIS — I48.91 ATRIAL FIBRILLATION, UNSPECIFIED TYPE (HCC): ICD-10-CM

## 2019-01-23 DIAGNOSIS — Z79.01 LONG TERM CURRENT USE OF ANTICOAGULANT THERAPY: ICD-10-CM

## 2019-01-23 LAB — INR BLD: 2

## 2019-01-23 PROCEDURE — 85610 PROTHROMBIN TIME: CPT | Performed by: FAMILY MEDICINE

## 2019-01-23 PROCEDURE — 99211 OFF/OP EST MAY X REQ PHY/QHP: CPT | Performed by: FAMILY MEDICINE

## 2019-01-24 ENCOUNTER — HOSPITAL ENCOUNTER (OUTPATIENT)
Dept: CARDIAC REHAB | Age: 79
Setting detail: THERAPIES SERIES
Discharge: HOME | End: 2019-01-24
Payer: MEDICARE

## 2019-01-28 ENCOUNTER — HOSPITAL ENCOUNTER (OUTPATIENT)
Dept: CARDIAC REHAB | Age: 79
Setting detail: THERAPIES SERIES
Discharge: HOME | End: 2019-01-28
Payer: MEDICARE

## 2019-02-04 ENCOUNTER — HOSPITAL ENCOUNTER (OUTPATIENT)
Dept: CARDIAC REHAB | Age: 79
Discharge: HOME | End: 2019-02-04

## 2019-02-04 PROCEDURE — 9900000059 HC CARDIAC REHAB PHASE 3 - 12 VISITS

## 2019-02-06 ENCOUNTER — HOSPITAL ENCOUNTER (OUTPATIENT)
Dept: CARDIAC REHAB | Age: 79
Setting detail: THERAPIES SERIES
Discharge: HOME | End: 2019-02-06

## 2019-02-27 ENCOUNTER — HOSPITAL ENCOUNTER (OUTPATIENT)
Dept: CARDIAC REHAB | Age: 79
Setting detail: THERAPIES SERIES
Discharge: HOME | End: 2019-02-27

## 2019-02-28 ENCOUNTER — HOSPITAL ENCOUNTER (OUTPATIENT)
Dept: CARDIAC REHAB | Age: 79
Setting detail: THERAPIES SERIES
Discharge: HOME | End: 2019-02-28

## 2019-03-01 ENCOUNTER — HOSPITAL ENCOUNTER (OUTPATIENT)
Dept: PHARMACY | Age: 79
Setting detail: THERAPIES SERIES
Discharge: HOME | End: 2019-03-01
Payer: MEDICARE

## 2019-03-01 VITALS
SYSTOLIC BLOOD PRESSURE: 109 MMHG | DIASTOLIC BLOOD PRESSURE: 72 MMHG | BODY MASS INDEX: 24.14 KG/M2 | HEART RATE: 71 BPM | WEIGHT: 183 LBS

## 2019-03-01 DIAGNOSIS — Z79.01 LONG TERM CURRENT USE OF ANTICOAGULANT THERAPY: ICD-10-CM

## 2019-03-01 LAB — INR BLD: 2.4

## 2019-03-01 PROCEDURE — 85610 PROTHROMBIN TIME: CPT

## 2019-03-01 PROCEDURE — 99211 OFF/OP EST MAY X REQ PHY/QHP: CPT

## 2019-03-04 ENCOUNTER — HOSPITAL ENCOUNTER (OUTPATIENT)
Dept: CARDIAC REHAB | Age: 79
Setting detail: THERAPIES SERIES
Discharge: HOME | End: 2019-03-04

## 2019-03-06 ENCOUNTER — HOSPITAL ENCOUNTER (OUTPATIENT)
Dept: CARDIAC REHAB | Age: 79
Setting detail: THERAPIES SERIES
Discharge: HOME | End: 2019-03-06

## 2019-03-07 ENCOUNTER — HOSPITAL ENCOUNTER (OUTPATIENT)
Dept: CARDIAC REHAB | Age: 79
Setting detail: THERAPIES SERIES
Discharge: HOME | End: 2019-03-07

## 2019-03-11 ENCOUNTER — HOSPITAL ENCOUNTER (OUTPATIENT)
Age: 79
Discharge: HOME | End: 2019-03-13
Payer: MEDICARE

## 2019-03-11 ENCOUNTER — HOSPITAL ENCOUNTER (OUTPATIENT)
Dept: CARDIAC REHAB | Age: 79
Setting detail: THERAPIES SERIES
Discharge: HOME | End: 2019-03-11

## 2019-03-11 ENCOUNTER — HOSPITAL ENCOUNTER (OUTPATIENT)
Dept: GENERAL RADIOLOGY | Age: 79
Discharge: HOME | End: 2019-03-13
Payer: MEDICARE

## 2019-03-11 DIAGNOSIS — M54.2 NECK PAIN: ICD-10-CM

## 2019-03-11 PROCEDURE — 72040 X-RAY EXAM NECK SPINE 2-3 VW: CPT

## 2019-03-11 PROCEDURE — 72050 X-RAY EXAM NECK SPINE 4/5VWS: CPT

## 2019-03-14 ENCOUNTER — HOSPITAL ENCOUNTER (OUTPATIENT)
Dept: CARDIAC REHAB | Age: 79
Setting detail: THERAPIES SERIES
Discharge: HOME | End: 2019-03-14

## 2019-03-20 ENCOUNTER — HOSPITAL ENCOUNTER (OUTPATIENT)
Dept: CARDIAC REHAB | Age: 79
Setting detail: THERAPIES SERIES
Discharge: HOME | End: 2019-03-20

## 2019-03-21 ENCOUNTER — HOSPITAL ENCOUNTER (OUTPATIENT)
Dept: CARDIAC REHAB | Age: 79
Setting detail: THERAPIES SERIES
Discharge: HOME | End: 2019-03-21

## 2019-03-25 ENCOUNTER — HOSPITAL ENCOUNTER (OUTPATIENT)
Dept: CARDIAC REHAB | Age: 79
Setting detail: THERAPIES SERIES
Discharge: HOME | End: 2019-03-25

## 2019-04-01 ENCOUNTER — HOSPITAL ENCOUNTER (OUTPATIENT)
Dept: CARDIAC REHAB | Age: 79
Setting detail: THERAPIES SERIES
Discharge: HOME | End: 2019-04-01

## 2019-04-03 ENCOUNTER — HOSPITAL ENCOUNTER (OUTPATIENT)
Dept: CARDIAC REHAB | Age: 79
Setting detail: THERAPIES SERIES
Discharge: HOME | End: 2019-04-03

## 2019-04-04 ENCOUNTER — HOSPITAL ENCOUNTER (OUTPATIENT)
Dept: CARDIAC REHAB | Age: 79
Setting detail: THERAPIES SERIES
Discharge: HOME | End: 2019-04-04

## 2019-04-11 ENCOUNTER — HOSPITAL ENCOUNTER (OUTPATIENT)
Dept: CARDIAC REHAB | Age: 79
Discharge: HOME | End: 2019-04-11

## 2019-04-15 ENCOUNTER — HOSPITAL ENCOUNTER (OUTPATIENT)
Dept: CARDIAC REHAB | Age: 79
Discharge: HOME | End: 2019-04-15

## 2019-04-19 ENCOUNTER — HOSPITAL ENCOUNTER (EMERGENCY)
Age: 79
Discharge: HOME | End: 2019-04-19
Attending: FAMILY MEDICINE
Payer: MEDICARE

## 2019-04-19 VITALS
SYSTOLIC BLOOD PRESSURE: 149 MMHG | RESPIRATION RATE: 16 BRPM | DIASTOLIC BLOOD PRESSURE: 108 MMHG | TEMPERATURE: 97 F | OXYGEN SATURATION: 99 % | HEART RATE: 79 BPM

## 2019-04-19 DIAGNOSIS — S80.11XA CONTUSION OF RIGHT LOWER EXTREMITY, INITIAL ENCOUNTER: Primary | ICD-10-CM

## 2019-04-19 PROCEDURE — 99283 EMERGENCY DEPT VISIT LOW MDM: CPT

## 2019-04-19 ASSESSMENT — PAIN DESCRIPTION - FREQUENCY: FREQUENCY: INTERMITTENT

## 2019-04-19 ASSESSMENT — PAIN SCALES - GENERAL: PAINLEVEL_OUTOF10: 3

## 2019-04-19 ASSESSMENT — PAIN DESCRIPTION - DESCRIPTORS: DESCRIPTORS: ACHING;DISCOMFORT

## 2019-04-19 ASSESSMENT — PAIN DESCRIPTION - ORIENTATION: ORIENTATION: RIGHT

## 2019-04-19 ASSESSMENT — PAIN DESCRIPTION - LOCATION: LOCATION: LEG

## 2019-04-19 ASSESSMENT — PAIN DESCRIPTION - PAIN TYPE: TYPE: ACUTE PAIN

## 2019-04-19 ASSESSMENT — ENCOUNTER SYMPTOMS
GASTROINTESTINAL NEGATIVE: 1
EYES NEGATIVE: 1
RESPIRATORY NEGATIVE: 1

## 2019-04-19 NOTE — ED PROVIDER NOTES
[facility] ED  EMERGENCY DEPARTMENT ENCOUNTER      Pt Name: Mary Fitzgerald  MRN: [id number]  [place] 1940  Date of evaluation: 4/19/2019  Provider: Karla Valadez MD    [address] [place] [address]     Chief Complaint   Patient presents with    Leg Pain     right leg; onset last night; worried about a blood clot         HISTORY OF PRESENT ILLNESS   (Location/Symptom, Timing/Onset, Context/Setting,Quality, Duration, Modifying Factors, Severity)  Note limiting factors. Mary Fitzgerald is [de-identified] y.o. male who presents to the emergency department a nodule on his right shin. He does not recall any trauma. It does not hurt. A nurse friends and felt that it might clot. He came for further evaluation and treatment. No fever chills sweats. The patient is on Coumadin and has had stable INR between 2 and 3 for many many months. Last time it was checked was 2 weeks ago. Nursing Notes were reviewed. Old records reviewed. REVIEW OF SYSTEMS    (2-9 systems for level 4, 10 or more for level 5)     Review of Systems   Constitutional: Negative. HENT: Negative. Eyes: Negative. Respiratory: Negative. Cardiovascular: Negative. Gastrointestinal: Negative. Genitourinary: Negative. Musculoskeletal: Negative. Skin: Negative. Neurological: Negative. Psychiatric/Behavioral: Negative. PAST MEDICAL HISTORY     Past Medical History:   Diagnosis Date    Atrial fibrillation ([address]     Bradycardia     CAD (coronary artery disease) 5/4/12    cabg x 5     S/P CABG x 5          SURGICALHISTORY       Past Surgical History:   Procedure Laterality Date    CORONARY ARTERY BYPASS GRAFT  5/4/12    cabg x 5          CURRENT MEDICATIONS       Previous Medications    ALPRAZOLAM (XANAX) 0.5 MG TABLET    Take 0.5 mg by mouth daily as needed for Sleep    ATORVASTATIN (LIPITOR) 40 MG TABLET    Take 40 mg by mouth daily. CLOPIDOGREL (PLAVIX) 75 MG TABLET    Take 75 mg by mouth daily.       COENZYME Q10 Concern    Not on file   Social History Narrative    Not on file       SCREENINGS    [place] Coma Scale  Eye Opening: Spontaneous  Best Verbal Response: Oriented  Best Motor Response: Obeys commands  [place] Coma Scale Score: 15        PHYSICAL EXAM    (up to 7 for level 4, 8 or more for level 5)     ED Triage Vitals [04/19/19 1224]   BP Temp Temp Source Pulse Resp SpO2 Height Weight   (!) 149/108 97 °F (36.1 °C) Tympanic 79 16 99 % -- --       Physical Exam   Constitutional: He is oriented to person, place, and time. He appears well-developed and well-nourished. HENT:   Head: Normocephalic and atraumatic. Mouth/Throat: Oropharynx is clear and moist.   Eyes: Pupils are equal, round, and reactive to light. EOM are normal.   Neck: Normal range of motion. Neck supple. Pulmonary/Chest: Effort normal. No respiratory distress. Musculoskeletal: Normal range of motion. He exhibits no deformity. There is a 2 mm area of purplish discoloration on the front of the shin with 2 cm diameter area of bogginess underneath. Multiple friable vessels are seen under the skin throughout the legs. Neurological: He is alert and oriented to person, place, and time. Skin: Skin is warm and dry. Psychiatric: He has a normal mood and affect. His behavior is normal.       DIAGNOSTIC RESULTS     EKG: All EKG's are interpreted by the Emergency Department Physician who either signs orCo-signs this chart in the absence of a cardiologist.      RADIOLOGY:   plain film images such as CT, Ultrasound and MRI are read by the radiologist. Plain radiographic images are visualized and preliminarily interpreted by the emergency physician with the below findings:    Interpretation per the Radiologist below, if available at the time of this note:    No orders to display       LABS:  Labs Reviewed - No data to display    All other labs were within normal range or not returned as of this dictation.     EMERGENCY DEPARTMENT COURSE and DIFFERENTIAL

## 2019-04-22 ENCOUNTER — HOSPITAL ENCOUNTER (OUTPATIENT)
Dept: CARDIAC REHAB | Age: 79
Discharge: HOME | End: 2019-04-22

## 2019-04-24 ENCOUNTER — HOSPITAL ENCOUNTER (OUTPATIENT)
Dept: CARDIAC REHAB | Age: 79
Discharge: HOME | End: 2019-04-24

## 2019-04-29 ENCOUNTER — HOSPITAL ENCOUNTER (OUTPATIENT)
Dept: CARDIAC REHAB | Age: 79
Discharge: HOME | End: 2019-04-29

## 2019-05-01 ENCOUNTER — HOSPITAL ENCOUNTER (OUTPATIENT)
Dept: CARDIAC REHAB | Age: 79
Discharge: HOME | End: 2019-05-01

## 2019-05-06 ENCOUNTER — HOSPITAL ENCOUNTER (OUTPATIENT)
Dept: CARDIAC REHAB | Age: 79
Discharge: HOME | End: 2019-05-06

## 2019-05-06 PROCEDURE — 9900000059 HC CARDIAC REHAB PHASE 3 - 12 VISITS

## 2019-05-10 ENCOUNTER — HOSPITAL ENCOUNTER (OUTPATIENT)
Dept: PHARMACY | Age: 79
Setting detail: THERAPIES SERIES
Discharge: HOME | End: 2019-05-10
Payer: MEDICARE

## 2019-05-10 VITALS
HEART RATE: 73 BPM | BODY MASS INDEX: 24.41 KG/M2 | DIASTOLIC BLOOD PRESSURE: 71 MMHG | SYSTOLIC BLOOD PRESSURE: 131 MMHG | WEIGHT: 185 LBS

## 2019-05-10 DIAGNOSIS — Z79.01 LONG TERM CURRENT USE OF ANTICOAGULANT THERAPY: ICD-10-CM

## 2019-05-10 LAB — INR BLD: 3

## 2019-05-10 PROCEDURE — 85610 PROTHROMBIN TIME: CPT

## 2019-05-10 PROCEDURE — 99211 OFF/OP EST MAY X REQ PHY/QHP: CPT

## 2019-05-10 NOTE — PROGRESS NOTES
Patient states no visible blood in urine and no black tarry stool. No change in other medications. Will return to clinic in 6 weeks. Started metoprolol (Lopressor) 25 mg BID.

## 2019-05-13 ENCOUNTER — HOSPITAL ENCOUNTER (OUTPATIENT)
Dept: CARDIAC REHAB | Age: 79
Discharge: HOME | End: 2019-05-13

## 2019-05-15 ENCOUNTER — HOSPITAL ENCOUNTER (OUTPATIENT)
Dept: CARDIAC REHAB | Age: 79
Discharge: HOME | End: 2019-05-15

## 2019-05-16 ENCOUNTER — HOSPITAL ENCOUNTER (OUTPATIENT)
Dept: CARDIAC REHAB | Age: 79
Discharge: HOME | End: 2019-05-16

## 2019-05-20 ENCOUNTER — HOSPITAL ENCOUNTER (OUTPATIENT)
Dept: CARDIAC REHAB | Age: 79
Discharge: HOME | End: 2019-05-20

## 2019-05-22 ENCOUNTER — HOSPITAL ENCOUNTER (OUTPATIENT)
Dept: CARDIAC REHAB | Age: 79
Discharge: HOME | End: 2019-05-22

## 2019-05-23 ENCOUNTER — HOSPITAL ENCOUNTER (OUTPATIENT)
Dept: CARDIAC REHAB | Age: 79
Discharge: HOME | End: 2019-05-23

## 2019-06-03 ENCOUNTER — HOSPITAL ENCOUNTER (OUTPATIENT)
Dept: PHARMACY | Age: 79
Setting detail: THERAPIES SERIES
Discharge: HOME | End: 2019-06-03
Payer: MEDICARE

## 2019-06-03 VITALS
DIASTOLIC BLOOD PRESSURE: 101 MMHG | WEIGHT: 186 LBS | HEART RATE: 107 BPM | SYSTOLIC BLOOD PRESSURE: 136 MMHG | BODY MASS INDEX: 24.54 KG/M2

## 2019-06-03 DIAGNOSIS — Z79.01 LONG TERM CURRENT USE OF ANTICOAGULANT THERAPY: ICD-10-CM

## 2019-06-03 LAB — INR BLD: 1.5

## 2019-06-03 PROCEDURE — 85610 PROTHROMBIN TIME: CPT

## 2019-06-03 PROCEDURE — 99212 OFFICE O/P EST SF 10 MIN: CPT

## 2019-06-03 RX ORDER — AMIODARONE HYDROCHLORIDE 200 MG/1
400 TABLET ORAL 2 TIMES DAILY
COMMUNITY
End: 2019-07-01 | Stop reason: ALTCHOICE

## 2019-06-03 RX ORDER — METOPROLOL TARTRATE 50 MG/1
50 TABLET, FILM COATED ORAL 2 TIMES DAILY
COMMUNITY
End: 2019-07-01 | Stop reason: ALTCHOICE

## 2019-06-03 NOTE — PATIENT INSTRUCTIONS
Continue to monitor urine and stool. Continue to monitor for signs of bleeding. Return to clinic in 1 week. Follow Lovenox bridge calendar.

## 2019-06-10 ENCOUNTER — HOSPITAL ENCOUNTER (OUTPATIENT)
Dept: PHARMACY | Age: 79
Setting detail: THERAPIES SERIES
Discharge: HOME | End: 2019-06-10
Payer: MEDICARE

## 2019-06-10 VITALS
WEIGHT: 183 LBS | SYSTOLIC BLOOD PRESSURE: 114 MMHG | HEART RATE: 70 BPM | DIASTOLIC BLOOD PRESSURE: 75 MMHG | BODY MASS INDEX: 24.14 KG/M2

## 2019-06-10 DIAGNOSIS — Z79.01 LONG TERM CURRENT USE OF ANTICOAGULANT THERAPY: ICD-10-CM

## 2019-06-10 LAB — INR BLD: 1.5

## 2019-06-10 PROCEDURE — 85610 PROTHROMBIN TIME: CPT

## 2019-06-10 PROCEDURE — 99212 OFFICE O/P EST SF 10 MIN: CPT

## 2019-06-10 NOTE — PROGRESS NOTES
Patient states no visible blood in urine and no black tarry stool. No change in other medications. Will return to clinic in 1 week. Amiodarone started 5-29-19 which will caused INR to increase over a 3 month period of time. Lipitor increased from 40 to 80 mg daily which could increase INR. Patient has only had 4 doses of warfarin since procedure. Patient will take warfarin 12 mg today and 8 mg on Tuesday and Wednesday and return to clinic on Thursday. Patient will continue Lovenox until return to clinic.

## 2019-06-10 NOTE — PATIENT INSTRUCTIONS
Continue to monitor urine and stool. Continue to monitor for signs of bleeding. Take warfarin 12 mg today and 8 mg on Tuesday and Wednesday and return to clinic on Thursday. Patient will continue Lovenox until return to clinic.

## 2019-06-12 ENCOUNTER — HOSPITAL ENCOUNTER (EMERGENCY)
Age: 79
Discharge: HOME | End: 2019-06-12
Payer: MEDICARE

## 2019-06-12 ENCOUNTER — HOSPITAL ENCOUNTER (OUTPATIENT)
Dept: PHARMACY | Age: 79
Setting detail: THERAPIES SERIES
Discharge: HOME | End: 2019-06-12
Payer: MEDICARE

## 2019-06-12 VITALS
OXYGEN SATURATION: 98 % | HEART RATE: 87 BPM | WEIGHT: 182 LBS | BODY MASS INDEX: 24.01 KG/M2 | TEMPERATURE: 98.1 F | RESPIRATION RATE: 16 BRPM | SYSTOLIC BLOOD PRESSURE: 118 MMHG | DIASTOLIC BLOOD PRESSURE: 82 MMHG

## 2019-06-12 VITALS
BODY MASS INDEX: 24.41 KG/M2 | SYSTOLIC BLOOD PRESSURE: 124 MMHG | HEART RATE: 80 BPM | DIASTOLIC BLOOD PRESSURE: 85 MMHG | WEIGHT: 185 LBS

## 2019-06-12 DIAGNOSIS — R58 BLEEDING: Primary | ICD-10-CM

## 2019-06-12 DIAGNOSIS — Z79.01 LONG TERM CURRENT USE OF ANTICOAGULANT THERAPY: ICD-10-CM

## 2019-06-12 DIAGNOSIS — I48.0 PAROXYSMAL ATRIAL FIBRILLATION (HCC): ICD-10-CM

## 2019-06-12 LAB — INR BLD: 2.9

## 2019-06-12 PROCEDURE — 2500000003 HC RX 250 WO HCPCS: Performed by: PHYSICIAN ASSISTANT

## 2019-06-12 PROCEDURE — 99282 EMERGENCY DEPT VISIT SF MDM: CPT

## 2019-06-12 PROCEDURE — 85610 PROTHROMBIN TIME: CPT

## 2019-06-12 PROCEDURE — 99211 OFF/OP EST MAY X REQ PHY/QHP: CPT

## 2019-06-12 PROCEDURE — 2580000003 HC RX 258: Performed by: PHYSICIAN ASSISTANT

## 2019-06-12 RX ORDER — TRANEXAMIC ACID 100 MG/ML
500 INJECTION, SOLUTION INTRAVENOUS ONCE
Status: COMPLETED | OUTPATIENT
Start: 2019-06-12 | End: 2019-06-12

## 2019-06-12 RX ADMIN — WATER 5 ML: 1 INJECTION INTRAMUSCULAR; INTRAVENOUS; SUBCUTANEOUS at 16:56

## 2019-06-12 RX ADMIN — TRANEXAMIC ACID 500 MG: 1 INJECTION, SOLUTION INTRAVENOUS at 16:56

## 2019-06-12 ASSESSMENT — ENCOUNTER SYMPTOMS
EYE REDNESS: 0
WHEEZING: 0
ABDOMINAL PAIN: 0
BACK PAIN: 0
EYE DISCHARGE: 0
COUGH: 0
SORE THROAT: 0
RHINORRHEA: 0
VOMITING: 0
SHORTNESS OF BREATH: 0
CONSTIPATION: 0
DIARRHEA: 0
CHEST TIGHTNESS: 0
BLOOD IN STOOL: 0
NAUSEA: 0

## 2019-06-12 NOTE — ED PROVIDER NOTES
mg by mouth 2 times daily. ALLERGIES     Patient has no known allergies. FAMILY HISTORY     History reviewed. No pertinent family history. SOCIAL HISTORY       Social History     Socioeconomic History    Marital status:      Spouse name: None    Number of children: None    Years of education: None    Highest education level: None   Occupational History    Occupation: retired pharmacist     Employer: [employer] Financial resource strain: None    Food insecurity:     Worry: None     Inability: None    Transportation needs:     Medical: None     Non-medical: None   Tobacco Use    Smoking status: Never Smoker    Smokeless tobacco: Never Used   Substance and Sexual Activity    Alcohol use: No    Drug use: No    Sexual activity: None   Lifestyle    Physical activity:     Days per week: None     Minutes per session: None    Stress: None   Relationships    Social connections:     Talks on phone: None     Gets together: None     Attends Ep[employer] service: None     Active member of club or organization: None     Attends meetings of clubs or organizations: None     Relationship status: None    Intimate partner violence:     Fear of current or ex partner: None     Emotionally abused: None     Physically abused: None     Forced sexual activity: None   Other Topics Concern    None   Social History Narrative    None       SCREENINGS    [place] Coma Scale  Eye Opening: Spontaneous  Best Verbal Response: Oriented  Best Motor Response: Obeys commands  [place] Coma Scale Score: 15 @FLOW([id number])@      PHYSICAL EXAM    (up to 7 for level 4, 8 or more for level 5)     ED Triage Vitals [06/12/19 1532]   BP Temp Temp Source Pulse Resp SpO2 Height Weight   (!) 114/90 98.1 °F (36.7 °C) Tympanic 87 16 98 % -- 182 lb (82.6 kg)       Physical Exam   Constitutional: He is oriented to person, place, and time. He appears well-developed and well-nourished. No distress.    HENT: Head: Normocephalic and atraumatic. Right Ear: External ear normal.   Left Ear: External ear normal.   Mouth/Throat: Uvula is midline, oropharynx is clear and moist and mucous membranes are normal. No trismus in the jaw. No oropharyngeal exudate. Eyes: Pupils are equal, round, and reactive to light. Conjunctivae and EOM are normal. Right eye exhibits no discharge. Left eye exhibits no discharge. No scleral icterus. Neck: Normal range of motion. Neck supple. No tracheal deviation present. Cardiovascular: Normal rate, regular rhythm and intact distal pulses. Exam reveals no gallop and no friction rub. No murmur heard. Pulmonary/Chest: Effort normal and breath sounds normal. No respiratory distress. Musculoskeletal: Normal range of motion. He exhibits no edema, tenderness or deformity. Neurological: He is alert and oriented to person, place, and time. He has normal reflexes. He displays normal reflexes. No cranial nerve deficit. He exhibits normal muscle tone. Skin: Skin is warm and dry. No rash noted. He is not diaphoretic. No erythema. Psychiatric: He has a normal mood and affect. His behavior is normal.   Nursing note and vitals reviewed. DIAGNOSTIC RESULTS     EKG: All EKG's are interpreted by the Emergency Department Physician who either signs orCo-signs this chart in the absence of a cardiologist.      RADIOLOGY:   Non-plainfilm images such as CT, Ultrasound and MRI are read by the radiologist. Plain radiographic images are visualized and preliminarily interpreted by the emergency physician with the below findings:      Interpretationper the Radiologist below, if available at the time of this note:    No orders to display         ED BEDSIDE ULTRASOUND:   Performed by ED Physician - none    LABS:  Labs Reviewed - No data to display    All other labs were within normal range or not returned as of this dictation.     EMERGENCY DEPARTMENT COURSE and DIFFERENTIAL DIAGNOSIS/MDM:   Vitals: [id number]  [phone]    Schedule an appointment as soon as possible for a visit in 1 day          Final Impression:   1.  Bleeding               (Please note that portions of this note were completed with a voice recognition program.  Efforts were made to edit the dictations but occasionally words are mis-transcribed.)           Jarad Knox PA-C  06/12/19 3598

## 2019-06-12 NOTE — PROGRESS NOTES
Patient contacted clinic to notify he is still having substantial bleeding from his mouth and cannot get it stopped. Counseled to go to ER if unable to get stopped. Patient voiced understanding.   Yanci Palacio [facility]., 6/12/2019, 3:16 PM

## 2019-06-12 NOTE — PROGRESS NOTES
Patient states no visible blood in urine and no black tarry stool. Will return to clinic in 1 week. Amiodarone started 5-29-19 which will caused INR to increase over a 3 month period of time. Lipitor increased from 40 to 80 mg daily which could increase INR. Patient will stop Lovenox now.

## 2019-06-13 ENCOUNTER — APPOINTMENT (OUTPATIENT)
Dept: PHARMACY | Age: 79
End: 2019-06-13
Payer: MEDICARE

## 2019-06-17 ENCOUNTER — HOSPITAL ENCOUNTER (OUTPATIENT)
Dept: CARDIAC REHAB | Age: 79
Discharge: HOME | End: 2019-06-17

## 2019-06-19 ENCOUNTER — HOSPITAL ENCOUNTER (OUTPATIENT)
Dept: CARDIAC REHAB | Age: 79
Discharge: HOME | End: 2019-06-19

## 2019-06-19 ENCOUNTER — HOSPITAL ENCOUNTER (OUTPATIENT)
Dept: PHARMACY | Age: 79
Setting detail: THERAPIES SERIES
Discharge: HOME | End: 2019-06-19
Payer: MEDICARE

## 2019-06-19 VITALS
WEIGHT: 183 LBS | HEART RATE: 74 BPM | DIASTOLIC BLOOD PRESSURE: 82 MMHG | BODY MASS INDEX: 24.14 KG/M2 | SYSTOLIC BLOOD PRESSURE: 106 MMHG

## 2019-06-19 DIAGNOSIS — Z79.01 LONG TERM CURRENT USE OF ANTICOAGULANT THERAPY: ICD-10-CM

## 2019-06-19 LAB — INR BLD: 5.9

## 2019-06-19 PROCEDURE — 85610 PROTHROMBIN TIME: CPT

## 2019-06-19 PROCEDURE — 99212 OFFICE O/P EST SF 10 MIN: CPT

## 2019-06-19 NOTE — PATIENT INSTRUCTIONS
Continue to monitor urine and stool. Continue to monitor for signs of bleeding. Return to clinic on Monday. Hold warfarin for 3 days then decrease dosage as directed on calendar.

## 2019-06-21 ENCOUNTER — HOSPITAL ENCOUNTER (OUTPATIENT)
Dept: PHARMACY | Age: 79
Setting detail: THERAPIES SERIES
End: 2019-06-21
Payer: MEDICARE

## 2019-06-23 ENCOUNTER — ANTI-COAG VISIT (OUTPATIENT)
Dept: PHARMACY | Age: 79
End: 2019-06-23

## 2019-06-23 NOTE — PROGRESS NOTES
Patient's wife Anabel Doherty contacted clinic via telephone stating that patient was admitted to [facility] yesterday for SOB. Appointment in clinic cancelled for Monday, 6/24/19. Patient will be seen by his cardiologist Dr. Kenna Galvez today. Patient will call clinic to rescheduled when he is discharged from [facility].

## 2019-06-24 ENCOUNTER — APPOINTMENT (OUTPATIENT)
Dept: PHARMACY | Age: 79
End: 2019-06-24
Payer: MEDICARE

## 2019-07-01 ENCOUNTER — HOSPITAL ENCOUNTER (OUTPATIENT)
Dept: PHARMACY | Age: 79
Setting detail: THERAPIES SERIES
Discharge: HOME | End: 2019-07-01
Payer: MEDICARE

## 2019-07-01 VITALS — HEART RATE: 74 BPM | DIASTOLIC BLOOD PRESSURE: 65 MMHG | SYSTOLIC BLOOD PRESSURE: 121 MMHG

## 2019-07-01 DIAGNOSIS — Z79.01 LONG TERM CURRENT USE OF ANTICOAGULANT THERAPY: ICD-10-CM

## 2019-07-01 LAB — INR BLD: 2.7

## 2019-07-01 PROCEDURE — 99211 OFF/OP EST MAY X REQ PHY/QHP: CPT

## 2019-07-01 PROCEDURE — 85610 PROTHROMBIN TIME: CPT

## 2019-07-01 RX ORDER — FUROSEMIDE 20 MG/1
80 TABLET ORAL 2 TIMES DAILY
COMMUNITY
End: 2019-07-31

## 2019-07-01 RX ORDER — METOPROLOL SUCCINATE 100 MG/1
100 TABLET, EXTENDED RELEASE ORAL DAILY
COMMUNITY
End: 2019-07-19 | Stop reason: ALTCHOICE

## 2019-07-01 RX ORDER — LISINOPRIL 2.5 MG/1
2.5 TABLET ORAL NIGHTLY
COMMUNITY
End: 2019-07-19 | Stop reason: ALTCHOICE

## 2019-07-01 NOTE — PATIENT INSTRUCTIONS
Please take warfarin 6 mg today and warfarin 8 mg tomorrow. Return to clinic on Wednesday, 7/3/19, at 8:45 am.   Continue to monitor urine and stool for signs and symptoms of bleeding. Please notify the clinic of any medication changes. Please remember to bring all medications (both prescription and OTC) to your next visit. Kindly notify the clinic if you are unable to make to your next appointment.

## 2019-07-03 ENCOUNTER — HOSPITAL ENCOUNTER (OUTPATIENT)
Dept: PHARMACY | Age: 79
Setting detail: THERAPIES SERIES
Discharge: HOME | End: 2019-07-03
Payer: MEDICARE

## 2019-07-03 VITALS
BODY MASS INDEX: 23.48 KG/M2 | DIASTOLIC BLOOD PRESSURE: 72 MMHG | WEIGHT: 178 LBS | SYSTOLIC BLOOD PRESSURE: 107 MMHG | HEART RATE: 78 BPM

## 2019-07-03 DIAGNOSIS — Z79.01 LONG TERM CURRENT USE OF ANTICOAGULANT THERAPY: ICD-10-CM

## 2019-07-03 LAB — INR BLD: 3.4

## 2019-07-03 PROCEDURE — 99212 OFFICE O/P EST SF 10 MIN: CPT

## 2019-07-03 PROCEDURE — 85610 PROTHROMBIN TIME: CPT

## 2019-07-11 ENCOUNTER — HOSPITAL ENCOUNTER (OUTPATIENT)
Dept: PHARMACY | Age: 79
Setting detail: THERAPIES SERIES
Discharge: HOME | End: 2019-07-11
Payer: MEDICARE

## 2019-07-11 VITALS
SYSTOLIC BLOOD PRESSURE: 108 MMHG | HEART RATE: 74 BPM | BODY MASS INDEX: 23.88 KG/M2 | DIASTOLIC BLOOD PRESSURE: 51 MMHG | WEIGHT: 181 LBS

## 2019-07-11 DIAGNOSIS — Z79.01 LONG TERM CURRENT USE OF ANTICOAGULANT THERAPY: ICD-10-CM

## 2019-07-11 LAB — INR BLD: 4.7

## 2019-07-11 PROCEDURE — 85610 PROTHROMBIN TIME: CPT

## 2019-07-11 PROCEDURE — 99212 OFFICE O/P EST SF 10 MIN: CPT

## 2019-07-11 NOTE — PROGRESS NOTES
Patient states no visible blood in urine and no black tarry stool. No change in other medications. Will return to clinic in 1 week. Patient was hospitalized at [facility] 6/23/19-6/2819 and had an ablation 6/27/19. AMIODARONE discontinued (which should make patient require more warfarin over a 3 month period of time) during [facility] hospitalization. Metoprolol tartrate was changed to metoprolol succinate, lisinopril and furosemide were added. Patient will hold warfarin today and tomorrow and then decrease warfarin dosage as directed on calendar.

## 2019-07-19 ENCOUNTER — HOSPITAL ENCOUNTER (OUTPATIENT)
Dept: PHARMACY | Age: 79
Setting detail: THERAPIES SERIES
Discharge: HOME | End: 2019-07-19
Payer: MEDICARE

## 2019-07-19 VITALS
BODY MASS INDEX: 24.33 KG/M2 | SYSTOLIC BLOOD PRESSURE: 106 MMHG | HEART RATE: 95 BPM | DIASTOLIC BLOOD PRESSURE: 79 MMHG | WEIGHT: 184.4 LBS

## 2019-07-19 DIAGNOSIS — Z79.01 LONG TERM CURRENT USE OF ANTICOAGULANT THERAPY: ICD-10-CM

## 2019-07-19 LAB — INR BLD: 2.7

## 2019-07-19 PROCEDURE — 99211 OFF/OP EST MAY X REQ PHY/QHP: CPT

## 2019-07-19 PROCEDURE — 85610 PROTHROMBIN TIME: CPT

## 2019-07-25 ENCOUNTER — APPOINTMENT (OUTPATIENT)
Dept: PHARMACY | Age: 79
End: 2019-07-25
Payer: MEDICARE

## 2019-07-26 ENCOUNTER — TELEPHONE (OUTPATIENT)
Dept: PHARMACY | Age: 79
End: 2019-07-26

## 2019-07-26 ENCOUNTER — APPOINTMENT (OUTPATIENT)
Dept: PHARMACY | Age: 79
End: 2019-07-26
Payer: MEDICARE

## 2019-07-26 RX ORDER — FUROSEMIDE 80 MG
80 TABLET ORAL SEE ADMIN INSTRUCTIONS
Refills: 1 | COMMUNITY
Start: 2019-07-25 | End: 2022-09-06

## 2019-07-26 RX ORDER — POTASSIUM CHLORIDE 1500 MG/1
10 TABLET, FILM COATED, EXTENDED RELEASE ORAL
Refills: 1 | COMMUNITY
Start: 2019-07-25 | End: 2021-02-26

## 2019-07-26 RX ORDER — IRON POLYSACCHARIDE COMPLEX 150 MG
150 CAPSULE ORAL DAILY
COMMUNITY

## 2019-07-31 ENCOUNTER — HOSPITAL ENCOUNTER (OUTPATIENT)
Age: 79
Discharge: HOME | End: 2019-07-31
Payer: MEDICARE

## 2019-07-31 ENCOUNTER — HOSPITAL ENCOUNTER (OUTPATIENT)
Dept: PHARMACY | Age: 79
Setting detail: THERAPIES SERIES
Discharge: HOME | End: 2019-07-31
Payer: MEDICARE

## 2019-07-31 VITALS
DIASTOLIC BLOOD PRESSURE: 56 MMHG | SYSTOLIC BLOOD PRESSURE: 108 MMHG | HEART RATE: 81 BPM | WEIGHT: 171 LBS | BODY MASS INDEX: 22.56 KG/M2

## 2019-07-31 DIAGNOSIS — Z79.01 LONG TERM CURRENT USE OF ANTICOAGULANT THERAPY: ICD-10-CM

## 2019-07-31 LAB
ABSOLUTE EOS #: 0.06 K/UL (ref 0–0.44)
ABSOLUTE IMMATURE GRANULOCYTE: <0.03 K/UL (ref 0–0.3)
ABSOLUTE LYMPH #: 1.13 K/UL (ref 1.1–3.7)
ABSOLUTE MONO #: 0.65 K/UL (ref 0.1–1.2)
ANION GAP SERPL CALCULATED.3IONS-SCNC: 14 MMOL/L (ref 9–17)
BASOPHILS # BLD: 0 % (ref 0–2)
BASOPHILS ABSOLUTE: <0.03 K/UL (ref 0–0.2)
BUN BLDV-MCNC: 57 MG/DL (ref 8–23)
BUN/CREAT BLD: 24 (ref 9–20)
CALCIUM SERPL-MCNC: 9.4 MG/DL (ref 8.6–10.4)
CHLORIDE BLD-SCNC: 94 MMOL/L (ref 98–107)
CO2: 32 MMOL/L (ref 20–31)
CREAT SERPL-MCNC: 2.37 MG/DL (ref 0.7–1.2)
DIFFERENTIAL TYPE: ABNORMAL
EOSINOPHILS RELATIVE PERCENT: 1 % (ref 1–4)
GFR AFRICAN AMERICAN: 32 ML/MIN
GFR NON-AFRICAN AMERICAN: 27 ML/MIN
GFR SERPL CREATININE-BSD FRML MDRD: ABNORMAL ML/MIN/{1.73_M2}
GFR SERPL CREATININE-BSD FRML MDRD: ABNORMAL ML/MIN/{1.73_M2}
GLUCOSE BLD-MCNC: 99 MG/DL (ref 70–99)
HCT VFR BLD CALC: 41.6 % (ref 40.7–50.3)
HEMOGLOBIN: 13.2 G/DL (ref 13–17)
IMMATURE GRANULOCYTES: 0 %
INR BLD: 2.3
LYMPHOCYTES # BLD: 24 % (ref 24–43)
MCH RBC QN AUTO: 32.1 PG (ref 25.2–33.5)
MCHC RBC AUTO-ENTMCNC: 31.7 G/DL (ref 28.4–34.8)
MCV RBC AUTO: 101.2 FL (ref 82.6–102.9)
MONOCYTES # BLD: 14 % (ref 3–12)
NRBC AUTOMATED: 0 PER 100 WBC
PDW BLD-RTO: 14.7 % (ref 11.8–14.4)
PLATELET # BLD: 219 K/UL (ref 138–453)
PLATELET ESTIMATE: ABNORMAL
PMV BLD AUTO: 9.9 FL (ref 8.1–13.5)
POTASSIUM SERPL-SCNC: 4 MMOL/L (ref 3.7–5.3)
RBC # BLD: 4.11 M/UL (ref 4.21–5.77)
RBC # BLD: ABNORMAL 10*6/UL
SEG NEUTROPHILS: 61 % (ref 36–65)
SEGMENTED NEUTROPHILS ABSOLUTE COUNT: 2.88 K/UL (ref 1.5–8.1)
SODIUM BLD-SCNC: 140 MMOL/L (ref 135–144)
WBC # BLD: 4.8 K/UL (ref 3.5–11.3)
WBC # BLD: ABNORMAL 10*3/UL

## 2019-07-31 PROCEDURE — 80048 BASIC METABOLIC PNL TOTAL CA: CPT

## 2019-07-31 PROCEDURE — 36415 COLL VENOUS BLD VENIPUNCTURE: CPT

## 2019-07-31 PROCEDURE — 99211 OFF/OP EST MAY X REQ PHY/QHP: CPT

## 2019-07-31 PROCEDURE — 85610 PROTHROMBIN TIME: CPT

## 2019-07-31 PROCEDURE — 85025 COMPLETE CBC W/AUTO DIFF WBC: CPT

## 2019-08-14 ENCOUNTER — HOSPITAL ENCOUNTER (OUTPATIENT)
Dept: NUTRITION | Age: 79
Discharge: HOME | End: 2019-08-14
Payer: MEDICARE

## 2019-08-14 ENCOUNTER — HOSPITAL ENCOUNTER (OUTPATIENT)
Dept: PHARMACY | Age: 79
Setting detail: THERAPIES SERIES
Discharge: HOME | End: 2019-08-14
Payer: MEDICARE

## 2019-08-14 VITALS — WEIGHT: 168 LBS | BODY MASS INDEX: 22.16 KG/M2

## 2019-08-14 DIAGNOSIS — Z79.01 LONG TERM CURRENT USE OF ANTICOAGULANT THERAPY: ICD-10-CM

## 2019-08-14 LAB — INR BLD: 2.3

## 2019-08-14 PROCEDURE — 97802 MEDICAL NUTRITION INDIV IN: CPT

## 2019-08-14 PROCEDURE — 99211 OFF/OP EST MAY X REQ PHY/QHP: CPT

## 2019-08-14 PROCEDURE — 85610 PROTHROMBIN TIME: CPT

## 2019-08-14 NOTE — PROGRESS NOTES
MNT provided for HLD, CAD, CKD II, CHF    Altered nutrition-related lab values  related to Organ/system dysfunction: CKD II as evidenced by Lab values: GFR (27), BUN (57), Cr (2.57)    Client data    Ht: 6'1\" Wt: 168# BMI: 22.2 (normal)   Weight changes: 14# weight loss with fluid losses CBW: 76.4 kg   BMR: 1533 calories Est. total calorie needs: ~4968-6138       Client overall goal for weight is for stable weight. Current eating pattern is consistent in meal timing. Use of whole grains, whole fruits and vegetables appear less than recommended quantities. There is no excess per recall after dietary modifications. Activity is increasing as tolerated. Client presents for MNT today with his wife Calin Kwon. Calin Kwon has made meal modifications and has incorporated more plant based foods such as tofu noodles, limiting protein foods such as beef, chicken and choosing fish more often, choosing lower potassium and phosphorus fruits, vegetables, and whole grains, and avoidance of sodium. They are using spices for flavor enhancers. They are consuming egg whites, vegetable omelettes, cream of rice cereal, blueberries, almond milk, wheat germ, quinoa, salads, ground turkey, onions, garlic, almonds, and water. Client received information on components of a renal diet:  I reviewed foods high in potassium (potatoes, tomatoes, oranges, etc.), sodium (processed food, packaged food, etc.), and phosphorus (milk, whole grains). I encouraged limiting animal based protein foods and choosing plant based proteins more often. Encouraged moderation from high K+ food sources and choosing low/medium sources more often. I reviewed soaking of potatoes to leech K+ out of them and substituting rice/noodles in place of potatoes. I encouraged apples, raspberries, blueberries, cucumbers, celery, etc. As lower K+ foods.  Discouraged high fat foods for heart health (fried foods, cream, sour cream, etc.)      Discussed and provided literature

## 2019-09-04 ENCOUNTER — HOSPITAL ENCOUNTER (OUTPATIENT)
Dept: PHARMACY | Age: 79
Setting detail: THERAPIES SERIES
Discharge: HOME | End: 2019-09-04
Payer: MEDICARE

## 2019-09-04 VITALS
BODY MASS INDEX: 22.43 KG/M2 | WEIGHT: 170 LBS | SYSTOLIC BLOOD PRESSURE: 109 MMHG | HEART RATE: 105 BPM | DIASTOLIC BLOOD PRESSURE: 65 MMHG

## 2019-09-04 DIAGNOSIS — Z79.01 LONG TERM CURRENT USE OF ANTICOAGULANT THERAPY: ICD-10-CM

## 2019-09-04 LAB — INR BLD: 1.9

## 2019-09-04 PROCEDURE — 99211 OFF/OP EST MAY X REQ PHY/QHP: CPT

## 2019-09-04 PROCEDURE — 85610 PROTHROMBIN TIME: CPT

## 2019-09-04 NOTE — PROGRESS NOTES
Patient states no visible blood in urine and no black tarry stool. No change in other medications. Will return to clinic in 2 weeks. Patient is having a place maker replacement with a pacer/defib and new leads on Monday 9/9/19. Patient will hold warfarin 2 days prior but no Lovenox bridge. Patient will take warfarin 6 mg (1.5 tablets) daily except Sat and Sun hold prior to procedure. Patient to follow up with nephrologist today. Patient may start new heart medication after procedure.

## 2019-09-16 ENCOUNTER — HOSPITAL ENCOUNTER (OUTPATIENT)
Dept: PHARMACY | Age: 79
Setting detail: THERAPIES SERIES
Discharge: HOME | End: 2019-09-16
Payer: MEDICARE

## 2019-09-16 VITALS
SYSTOLIC BLOOD PRESSURE: 118 MMHG | BODY MASS INDEX: 21.51 KG/M2 | WEIGHT: 163 LBS | DIASTOLIC BLOOD PRESSURE: 77 MMHG | HEART RATE: 75 BPM

## 2019-09-16 DIAGNOSIS — Z79.01 LONG TERM CURRENT USE OF ANTICOAGULANT THERAPY: ICD-10-CM

## 2019-09-16 LAB — INR BLD: 1.5

## 2019-09-16 PROCEDURE — 99212 OFFICE O/P EST SF 10 MIN: CPT

## 2019-09-16 PROCEDURE — 85610 PROTHROMBIN TIME: CPT

## 2019-09-16 RX ORDER — MEXILETINE HYDROCHLORIDE 200 MG/1
150 CAPSULE ORAL 3 TIMES DAILY
COMMUNITY
End: 2019-10-10 | Stop reason: DRUGHIGH

## 2019-09-16 NOTE — PROGRESS NOTES
Patient states no visible blood in urine and no black tarry stool. No change in other medications. Will return to clinic in 2 weeks. Patient was off warfarin for 2 days in preparation for new pacemaker defribillator and patient started Mexilitine 200 mg po TID. Patient to follow up with Dr Cal Winchester on Sept 30 and in October to [facility]. Patient will take warfarin 2 tablets (8 mg) today then continue 1 tablet (4 mg) on Mon, Fri and 1.5 tablets (6 mg) all other days.

## 2019-09-26 ENCOUNTER — HOSPITAL ENCOUNTER (OUTPATIENT)
Dept: PHARMACY | Age: 79
Setting detail: THERAPIES SERIES
Discharge: HOME | End: 2019-09-26
Payer: MEDICARE

## 2019-09-26 VITALS
DIASTOLIC BLOOD PRESSURE: 71 MMHG | SYSTOLIC BLOOD PRESSURE: 104 MMHG | BODY MASS INDEX: 21.24 KG/M2 | HEART RATE: 71 BPM | WEIGHT: 161 LBS

## 2019-09-26 DIAGNOSIS — Z79.01 LONG TERM CURRENT USE OF ANTICOAGULANT THERAPY: ICD-10-CM

## 2019-09-26 LAB — INR BLD: 1.8

## 2019-09-26 PROCEDURE — 99211 OFF/OP EST MAY X REQ PHY/QHP: CPT

## 2019-09-26 PROCEDURE — 85610 PROTHROMBIN TIME: CPT

## 2019-10-01 ENCOUNTER — HOSPITAL ENCOUNTER (OUTPATIENT)
Dept: CARDIAC REHAB | Age: 79
Setting detail: THERAPIES SERIES
Discharge: HOME | End: 2019-10-01
Payer: MEDICARE

## 2019-10-01 VITALS
HEIGHT: 72 IN | SYSTOLIC BLOOD PRESSURE: 96 MMHG | BODY MASS INDEX: 21.88 KG/M2 | WEIGHT: 161.5 LBS | HEART RATE: 73 BPM | OXYGEN SATURATION: 100 % | RESPIRATION RATE: 16 BRPM | DIASTOLIC BLOOD PRESSURE: 68 MMHG

## 2019-10-01 ASSESSMENT — PATIENT HEALTH QUESTIONNAIRE - PHQ9
SUM OF ALL RESPONSES TO PHQ QUESTIONS 1-9: 3
SUM OF ALL RESPONSES TO PHQ QUESTIONS 1-9: 0

## 2019-10-02 ENCOUNTER — HOSPITAL ENCOUNTER (OUTPATIENT)
Dept: CARDIAC REHAB | Age: 79
Setting detail: THERAPIES SERIES
Discharge: HOME | End: 2019-10-02
Payer: MEDICARE

## 2019-10-02 ENCOUNTER — APPOINTMENT (OUTPATIENT)
Dept: CARDIAC REHAB | Age: 79
End: 2019-10-02
Payer: MEDICARE

## 2019-10-02 PROCEDURE — 93798 PHYS/QHP OP CAR RHAB W/ECG: CPT

## 2019-10-03 ENCOUNTER — HOSPITAL ENCOUNTER (OUTPATIENT)
Dept: CARDIAC REHAB | Age: 79
Setting detail: THERAPIES SERIES
Discharge: HOME | End: 2019-10-03
Payer: MEDICARE

## 2019-10-03 PROCEDURE — 93798 PHYS/QHP OP CAR RHAB W/ECG: CPT

## 2019-10-07 ENCOUNTER — HOSPITAL ENCOUNTER (OUTPATIENT)
Dept: CARDIAC REHAB | Age: 79
Setting detail: THERAPIES SERIES
Discharge: HOME | End: 2019-10-07
Payer: MEDICARE

## 2019-10-07 PROCEDURE — 93798 PHYS/QHP OP CAR RHAB W/ECG: CPT

## 2019-10-09 ENCOUNTER — HOSPITAL ENCOUNTER (OUTPATIENT)
Dept: CARDIAC REHAB | Age: 79
Setting detail: THERAPIES SERIES
Discharge: HOME | End: 2019-10-09
Payer: MEDICARE

## 2019-10-09 PROCEDURE — 93798 PHYS/QHP OP CAR RHAB W/ECG: CPT

## 2019-10-10 ENCOUNTER — HOSPITAL ENCOUNTER (OUTPATIENT)
Dept: PHARMACY | Age: 79
Setting detail: THERAPIES SERIES
Discharge: HOME | End: 2019-10-10
Payer: MEDICARE

## 2019-10-10 ENCOUNTER — HOSPITAL ENCOUNTER (OUTPATIENT)
Dept: CARDIAC REHAB | Age: 79
Setting detail: THERAPIES SERIES
Discharge: HOME | End: 2019-10-10
Payer: MEDICARE

## 2019-10-10 VITALS
SYSTOLIC BLOOD PRESSURE: 114 MMHG | HEART RATE: 76 BPM | BODY MASS INDEX: 21.56 KG/M2 | DIASTOLIC BLOOD PRESSURE: 72 MMHG | WEIGHT: 159 LBS

## 2019-10-10 DIAGNOSIS — Z79.01 LONG TERM CURRENT USE OF ANTICOAGULANT THERAPY: ICD-10-CM

## 2019-10-10 LAB — INR BLD: 1.4

## 2019-10-10 PROCEDURE — 93798 PHYS/QHP OP CAR RHAB W/ECG: CPT

## 2019-10-10 PROCEDURE — 99212 OFFICE O/P EST SF 10 MIN: CPT

## 2019-10-10 PROCEDURE — 85610 PROTHROMBIN TIME: CPT

## 2019-10-10 RX ORDER — MEXILETINE HYDROCHLORIDE 150 MG/1
150 CAPSULE ORAL 3 TIMES DAILY
COMMUNITY
End: 2020-11-23 | Stop reason: DRUGHIGH

## 2019-10-14 ENCOUNTER — HOSPITAL ENCOUNTER (OUTPATIENT)
Dept: CARDIAC REHAB | Age: 79
Setting detail: THERAPIES SERIES
Discharge: HOME | End: 2019-10-14
Payer: MEDICARE

## 2019-10-14 PROCEDURE — 93798 PHYS/QHP OP CAR RHAB W/ECG: CPT

## 2019-10-16 ENCOUNTER — HOSPITAL ENCOUNTER (OUTPATIENT)
Dept: CARDIAC REHAB | Age: 79
Setting detail: THERAPIES SERIES
Discharge: HOME | End: 2019-10-16
Payer: MEDICARE

## 2019-10-16 PROCEDURE — 93798 PHYS/QHP OP CAR RHAB W/ECG: CPT

## 2019-10-17 ENCOUNTER — HOSPITAL ENCOUNTER (OUTPATIENT)
Dept: CARDIAC REHAB | Age: 79
Setting detail: THERAPIES SERIES
Discharge: HOME | End: 2019-10-17
Payer: MEDICARE

## 2019-10-17 PROCEDURE — 93798 PHYS/QHP OP CAR RHAB W/ECG: CPT

## 2019-10-21 ENCOUNTER — HOSPITAL ENCOUNTER (OUTPATIENT)
Dept: CARDIAC REHAB | Age: 79
Setting detail: THERAPIES SERIES
Discharge: HOME | End: 2019-10-21
Payer: MEDICARE

## 2019-10-21 ENCOUNTER — HOSPITAL ENCOUNTER (OUTPATIENT)
Dept: PHARMACY | Age: 79
Setting detail: THERAPIES SERIES
Discharge: HOME | End: 2019-10-21
Payer: MEDICARE

## 2019-10-21 VITALS
HEART RATE: 70 BPM | BODY MASS INDEX: 21.7 KG/M2 | SYSTOLIC BLOOD PRESSURE: 100 MMHG | WEIGHT: 160 LBS | DIASTOLIC BLOOD PRESSURE: 55 MMHG

## 2019-10-21 DIAGNOSIS — Z79.01 LONG TERM CURRENT USE OF ANTICOAGULANT THERAPY: ICD-10-CM

## 2019-10-21 LAB — INR BLD: 1.8

## 2019-10-21 PROCEDURE — 85610 PROTHROMBIN TIME: CPT

## 2019-10-21 PROCEDURE — 99212 OFFICE O/P EST SF 10 MIN: CPT

## 2019-10-21 PROCEDURE — 93798 PHYS/QHP OP CAR RHAB W/ECG: CPT

## 2019-10-23 ENCOUNTER — HOSPITAL ENCOUNTER (OUTPATIENT)
Dept: CARDIAC REHAB | Age: 79
Setting detail: THERAPIES SERIES
Discharge: HOME | End: 2019-10-23
Payer: MEDICARE

## 2019-10-23 PROCEDURE — 93798 PHYS/QHP OP CAR RHAB W/ECG: CPT

## 2019-10-24 ENCOUNTER — HOSPITAL ENCOUNTER (OUTPATIENT)
Dept: CARDIAC REHAB | Age: 79
Setting detail: THERAPIES SERIES
Discharge: HOME | End: 2019-10-24
Payer: MEDICARE

## 2019-10-24 PROCEDURE — 93798 PHYS/QHP OP CAR RHAB W/ECG: CPT

## 2019-10-28 ENCOUNTER — HOSPITAL ENCOUNTER (OUTPATIENT)
Dept: CARDIAC REHAB | Age: 79
Setting detail: THERAPIES SERIES
Discharge: HOME | End: 2019-10-28
Payer: MEDICARE

## 2019-10-28 PROCEDURE — 93798 PHYS/QHP OP CAR RHAB W/ECG: CPT

## 2019-10-30 ENCOUNTER — HOSPITAL ENCOUNTER (OUTPATIENT)
Dept: CARDIAC REHAB | Age: 79
Setting detail: THERAPIES SERIES
Discharge: HOME | End: 2019-10-30
Payer: MEDICARE

## 2019-10-30 PROCEDURE — 93798 PHYS/QHP OP CAR RHAB W/ECG: CPT

## 2019-10-31 ENCOUNTER — HOSPITAL ENCOUNTER (OUTPATIENT)
Dept: CARDIAC REHAB | Age: 79
Setting detail: THERAPIES SERIES
Discharge: HOME | End: 2019-10-31
Payer: MEDICARE

## 2019-10-31 PROCEDURE — 93798 PHYS/QHP OP CAR RHAB W/ECG: CPT

## 2019-11-04 ENCOUNTER — HOSPITAL ENCOUNTER (OUTPATIENT)
Dept: CARDIAC REHAB | Age: 79
Setting detail: THERAPIES SERIES
Discharge: HOME | End: 2019-11-04
Payer: MEDICARE

## 2019-11-04 ENCOUNTER — HOSPITAL ENCOUNTER (OUTPATIENT)
Dept: PHARMACY | Age: 79
Setting detail: THERAPIES SERIES
Discharge: HOME | End: 2019-11-04
Payer: MEDICARE

## 2019-11-04 VITALS
DIASTOLIC BLOOD PRESSURE: 59 MMHG | HEART RATE: 91 BPM | BODY MASS INDEX: 21.84 KG/M2 | SYSTOLIC BLOOD PRESSURE: 96 MMHG | WEIGHT: 161 LBS

## 2019-11-04 DIAGNOSIS — Z79.01 LONG TERM CURRENT USE OF ANTICOAGULANT THERAPY: ICD-10-CM

## 2019-11-04 LAB — INR BLD: 2

## 2019-11-04 PROCEDURE — 99211 OFF/OP EST MAY X REQ PHY/QHP: CPT

## 2019-11-04 PROCEDURE — 93798 PHYS/QHP OP CAR RHAB W/ECG: CPT

## 2019-11-04 PROCEDURE — 85610 PROTHROMBIN TIME: CPT

## 2019-11-06 ENCOUNTER — HOSPITAL ENCOUNTER (OUTPATIENT)
Dept: CARDIAC REHAB | Age: 79
Setting detail: THERAPIES SERIES
Discharge: HOME | End: 2019-11-06
Payer: MEDICARE

## 2019-11-06 PROCEDURE — 93798 PHYS/QHP OP CAR RHAB W/ECG: CPT

## 2019-11-11 ENCOUNTER — HOSPITAL ENCOUNTER (OUTPATIENT)
Dept: CARDIAC REHAB | Age: 79
Setting detail: THERAPIES SERIES
Discharge: HOME | End: 2019-11-11
Payer: MEDICARE

## 2019-11-11 PROCEDURE — 93798 PHYS/QHP OP CAR RHAB W/ECG: CPT

## 2019-11-13 ENCOUNTER — HOSPITAL ENCOUNTER (OUTPATIENT)
Dept: CARDIAC REHAB | Age: 79
Setting detail: THERAPIES SERIES
Discharge: HOME | End: 2019-11-13
Payer: MEDICARE

## 2019-11-13 ENCOUNTER — HOSPITAL ENCOUNTER (OUTPATIENT)
Age: 79
Discharge: HOME | End: 2019-11-15
Payer: MEDICARE

## 2019-11-13 ENCOUNTER — HOSPITAL ENCOUNTER (OUTPATIENT)
Dept: GENERAL RADIOLOGY | Age: 79
Discharge: HOME | End: 2019-11-15
Payer: MEDICARE

## 2019-11-13 DIAGNOSIS — R52 PAIN: ICD-10-CM

## 2019-11-13 PROCEDURE — 73552 X-RAY EXAM OF FEMUR 2/>: CPT

## 2019-11-13 PROCEDURE — 93798 PHYS/QHP OP CAR RHAB W/ECG: CPT

## 2019-11-13 PROCEDURE — 73560 X-RAY EXAM OF KNEE 1 OR 2: CPT

## 2019-11-14 ENCOUNTER — HOSPITAL ENCOUNTER (OUTPATIENT)
Dept: CARDIAC REHAB | Age: 79
Setting detail: THERAPIES SERIES
Discharge: HOME | End: 2019-11-14
Payer: MEDICARE

## 2019-11-14 PROCEDURE — 93798 PHYS/QHP OP CAR RHAB W/ECG: CPT

## 2019-11-21 ENCOUNTER — HOSPITAL ENCOUNTER (OUTPATIENT)
Dept: CARDIAC REHAB | Age: 79
Setting detail: THERAPIES SERIES
Discharge: HOME | End: 2019-11-21
Payer: MEDICARE

## 2019-11-21 PROCEDURE — 93798 PHYS/QHP OP CAR RHAB W/ECG: CPT

## 2019-11-25 ENCOUNTER — HOSPITAL ENCOUNTER (OUTPATIENT)
Dept: CARDIAC REHAB | Age: 79
Setting detail: THERAPIES SERIES
Discharge: HOME | End: 2019-11-25
Payer: MEDICARE

## 2019-11-25 ENCOUNTER — HOSPITAL ENCOUNTER (OUTPATIENT)
Dept: PHARMACY | Age: 79
Setting detail: THERAPIES SERIES
Discharge: HOME | End: 2019-11-25
Payer: MEDICARE

## 2019-11-25 VITALS
DIASTOLIC BLOOD PRESSURE: 64 MMHG | BODY MASS INDEX: 21.56 KG/M2 | SYSTOLIC BLOOD PRESSURE: 130 MMHG | HEART RATE: 70 BPM | WEIGHT: 159 LBS

## 2019-11-25 DIAGNOSIS — Z79.01 LONG TERM CURRENT USE OF ANTICOAGULANT THERAPY: ICD-10-CM

## 2019-11-25 LAB — INR BLD: 1.6

## 2019-11-25 PROCEDURE — 85610 PROTHROMBIN TIME: CPT

## 2019-11-25 PROCEDURE — 93798 PHYS/QHP OP CAR RHAB W/ECG: CPT

## 2019-11-25 PROCEDURE — 99212 OFFICE O/P EST SF 10 MIN: CPT

## 2019-11-27 ENCOUNTER — HOSPITAL ENCOUNTER (OUTPATIENT)
Dept: CARDIAC REHAB | Age: 79
Setting detail: THERAPIES SERIES
Discharge: HOME | End: 2019-11-27
Payer: MEDICARE

## 2019-11-27 PROCEDURE — 93798 PHYS/QHP OP CAR RHAB W/ECG: CPT

## 2019-12-02 ENCOUNTER — HOSPITAL ENCOUNTER (OUTPATIENT)
Dept: CARDIAC REHAB | Age: 79
Setting detail: THERAPIES SERIES
Discharge: HOME | End: 2019-12-02
Payer: MEDICARE

## 2019-12-02 PROCEDURE — 93798 PHYS/QHP OP CAR RHAB W/ECG: CPT

## 2019-12-06 ENCOUNTER — HOSPITAL ENCOUNTER (OUTPATIENT)
Dept: PHARMACY | Age: 79
Setting detail: THERAPIES SERIES
Discharge: HOME | End: 2019-12-06
Payer: MEDICARE

## 2019-12-06 VITALS
SYSTOLIC BLOOD PRESSURE: 105 MMHG | BODY MASS INDEX: 21.56 KG/M2 | DIASTOLIC BLOOD PRESSURE: 75 MMHG | HEART RATE: 71 BPM | WEIGHT: 159 LBS

## 2019-12-06 DIAGNOSIS — Z79.01 LONG TERM CURRENT USE OF ANTICOAGULANT THERAPY: ICD-10-CM

## 2019-12-06 LAB — INR BLD: 1.9

## 2019-12-06 PROCEDURE — 85610 PROTHROMBIN TIME: CPT

## 2019-12-06 PROCEDURE — 99211 OFF/OP EST MAY X REQ PHY/QHP: CPT

## 2019-12-09 ENCOUNTER — HOSPITAL ENCOUNTER (OUTPATIENT)
Dept: CARDIAC REHAB | Age: 79
Setting detail: THERAPIES SERIES
Discharge: HOME | End: 2019-12-09
Payer: MEDICARE

## 2019-12-09 ENCOUNTER — APPOINTMENT (OUTPATIENT)
Dept: PHARMACY | Age: 79
End: 2019-12-09
Payer: MEDICARE

## 2019-12-09 PROCEDURE — 93798 PHYS/QHP OP CAR RHAB W/ECG: CPT

## 2019-12-11 ENCOUNTER — HOSPITAL ENCOUNTER (OUTPATIENT)
Dept: CARDIAC REHAB | Age: 79
Setting detail: THERAPIES SERIES
Discharge: HOME | End: 2019-12-11
Payer: MEDICARE

## 2019-12-11 PROCEDURE — 93798 PHYS/QHP OP CAR RHAB W/ECG: CPT

## 2019-12-12 ENCOUNTER — HOSPITAL ENCOUNTER (OUTPATIENT)
Dept: CARDIAC REHAB | Age: 79
Setting detail: THERAPIES SERIES
Discharge: HOME | End: 2019-12-12
Payer: MEDICARE

## 2019-12-12 PROCEDURE — 93798 PHYS/QHP OP CAR RHAB W/ECG: CPT

## 2019-12-16 ENCOUNTER — HOSPITAL ENCOUNTER (OUTPATIENT)
Dept: CARDIAC REHAB | Age: 79
Setting detail: THERAPIES SERIES
Discharge: HOME | End: 2019-12-16
Payer: MEDICARE

## 2019-12-16 PROCEDURE — 93798 PHYS/QHP OP CAR RHAB W/ECG: CPT

## 2019-12-18 ENCOUNTER — HOSPITAL ENCOUNTER (OUTPATIENT)
Dept: CARDIAC REHAB | Age: 79
Setting detail: THERAPIES SERIES
Discharge: HOME | End: 2019-12-18
Payer: MEDICARE

## 2019-12-18 PROCEDURE — 93798 PHYS/QHP OP CAR RHAB W/ECG: CPT

## 2019-12-19 ENCOUNTER — HOSPITAL ENCOUNTER (OUTPATIENT)
Dept: PHARMACY | Age: 79
Setting detail: THERAPIES SERIES
Discharge: HOME | End: 2019-12-19
Payer: MEDICARE

## 2019-12-19 VITALS
WEIGHT: 160 LBS | HEART RATE: 93 BPM | BODY MASS INDEX: 21.7 KG/M2 | DIASTOLIC BLOOD PRESSURE: 74 MMHG | SYSTOLIC BLOOD PRESSURE: 112 MMHG

## 2019-12-19 DIAGNOSIS — Z79.01 LONG TERM CURRENT USE OF ANTICOAGULANT THERAPY: ICD-10-CM

## 2019-12-19 LAB — INR BLD: 2.2

## 2019-12-19 PROCEDURE — 99211 OFF/OP EST MAY X REQ PHY/QHP: CPT

## 2019-12-19 PROCEDURE — 85610 PROTHROMBIN TIME: CPT

## 2019-12-23 ENCOUNTER — HOSPITAL ENCOUNTER (OUTPATIENT)
Dept: CARDIAC REHAB | Age: 79
Setting detail: THERAPIES SERIES
Discharge: HOME | End: 2019-12-23
Payer: MEDICARE

## 2019-12-23 PROCEDURE — 93798 PHYS/QHP OP CAR RHAB W/ECG: CPT

## 2019-12-30 ENCOUNTER — HOSPITAL ENCOUNTER (OUTPATIENT)
Dept: CARDIAC REHAB | Age: 79
Setting detail: THERAPIES SERIES
Discharge: HOME | End: 2019-12-30
Payer: MEDICARE

## 2019-12-30 PROCEDURE — 93798 PHYS/QHP OP CAR RHAB W/ECG: CPT

## 2020-01-02 ENCOUNTER — HOSPITAL ENCOUNTER (OUTPATIENT)
Dept: CARDIAC REHAB | Age: 80
Setting detail: THERAPIES SERIES
Discharge: HOME | End: 2020-01-02
Payer: MEDICARE

## 2020-01-02 PROCEDURE — 93798 PHYS/QHP OP CAR RHAB W/ECG: CPT

## 2020-01-06 ENCOUNTER — HOSPITAL ENCOUNTER (OUTPATIENT)
Dept: CARDIAC REHAB | Age: 80
Setting detail: THERAPIES SERIES
Discharge: HOME | End: 2020-01-06
Payer: MEDICARE

## 2020-01-06 PROCEDURE — 93798 PHYS/QHP OP CAR RHAB W/ECG: CPT

## 2020-01-09 ENCOUNTER — HOSPITAL ENCOUNTER (OUTPATIENT)
Dept: CARDIAC REHAB | Age: 80
Setting detail: THERAPIES SERIES
Discharge: HOME | End: 2020-01-09
Payer: MEDICARE

## 2020-01-09 PROCEDURE — 93798 PHYS/QHP OP CAR RHAB W/ECG: CPT

## 2020-01-13 ENCOUNTER — HOSPITAL ENCOUNTER (OUTPATIENT)
Dept: CARDIAC REHAB | Age: 80
Setting detail: THERAPIES SERIES
Discharge: HOME | End: 2020-01-13
Payer: MEDICARE

## 2020-01-13 ENCOUNTER — HOSPITAL ENCOUNTER (OUTPATIENT)
Dept: PHARMACY | Age: 80
Setting detail: THERAPIES SERIES
Discharge: HOME | End: 2020-01-13
Payer: MEDICARE

## 2020-01-13 VITALS
DIASTOLIC BLOOD PRESSURE: 63 MMHG | HEART RATE: 72 BPM | WEIGHT: 158 LBS | SYSTOLIC BLOOD PRESSURE: 121 MMHG | BODY MASS INDEX: 21.43 KG/M2

## 2020-01-13 LAB — INR BLD: 1.7

## 2020-01-13 PROCEDURE — 85610 PROTHROMBIN TIME: CPT

## 2020-01-13 PROCEDURE — 99212 OFFICE O/P EST SF 10 MIN: CPT

## 2020-01-13 PROCEDURE — 93798 PHYS/QHP OP CAR RHAB W/ECG: CPT

## 2020-01-13 RX ORDER — METOPROLOL SUCCINATE 25 MG/1
50 TABLET, EXTENDED RELEASE ORAL DAILY
COMMUNITY
End: 2021-10-26 | Stop reason: DRUGHIGH

## 2020-01-13 NOTE — PROGRESS NOTES
Patient states no visible blood in urine and no black tarry stool. No change in other medications.   Will return to clinic in 2 weeks.  Kandi Lancaster will increase warfarin dosage to 2 tablets for 8 mg on Monday, Wednesday and Fridays and 1.5 tablets for 6 mg all other days.

## 2020-01-20 ENCOUNTER — HOSPITAL ENCOUNTER (OUTPATIENT)
Dept: CARDIAC REHAB | Age: 80
Setting detail: THERAPIES SERIES
Discharge: HOME | End: 2020-01-20
Payer: MEDICARE

## 2020-01-20 PROCEDURE — 93798 PHYS/QHP OP CAR RHAB W/ECG: CPT

## 2020-01-22 ENCOUNTER — HOSPITAL ENCOUNTER (OUTPATIENT)
Dept: CARDIAC REHAB | Age: 80
Setting detail: THERAPIES SERIES
Discharge: HOME | End: 2020-01-22
Payer: MEDICARE

## 2020-01-22 PROCEDURE — 93798 PHYS/QHP OP CAR RHAB W/ECG: CPT

## 2020-01-22 NOTE — PROGRESS NOTES
HbA1c:  [] Med Change? Random BS:   [] BS in range    Weight Management:  Weight: 159.5  Height: 6 ft  BMI: 22  Wt Goal: 1-2 lbs/wk  Special Diet: heart healthy    Intervention:   [] Dietitian Consult       [x] Nurse/Patient Discussion     [] Diet Class           [] Referred to Diabetes Education     Education:  [x] Education Goals met    Target Goal:  -LDL-C<100 if triglycerides are > 200  -LDL-C < 70 for high risk patients  -HbA1c < 7%  -BMI < 25   Education    Stages of Change:    [] pre-contemplation   [x] Action   [] Contemplate   [] Maintainence   [x] Prep   [] Relapse    Knowledge test score: 100%      Family support: [x] Yes  [] No    Tobacco use: [] Yes  [x] No    Intervention:  [] Referred to smoking cessation counselor     [] Individual education and counseling  [] Tobacco adjunct  [] Informed of education class schedule     Education:   [x] Education goals met    Target Goal:  -Complete cessation of tobacco use (if applicable)  -Continued risk factor modifications  -Recognizing signs/symptoms to report  -Proper use of meds    Psychosocial  Stages of Change:    [] pre-contemplation   [x] Action   [] Contemplate   [] Maintainence   [x] Prep   [] Relapse    Psychosocial Test:  Tool Used: [employer] Quality of Life  Score: 27.71  Depression screening score: 1/9  []Medication change? Education:    [x] Education Goals met    Target Goal:  -Assess presence or absence of depression using a valid screening tool. -Maximize coping skills.  -Positive support system.     Preventative Medication:   [] Aspirin       [x] Beta Blockade      [x] Statin or other lipid lowering agent     [x] Clopidogrel   [] ACE Inhibitor   [x] Other anticoagulation medications     Fall Risk assess: [x] Yes  [] No  Assistive Device:   [] Cane  [] Walker [] Wheel Chair  [] Gait belt    Patient/Program goal:   -increased stamina/strength to 30-50 total exercise by increasing 1-2 level/wk and 1-2   min/wk  to achieve THR and RPE 95-33-Kwreiw session METS 1.7.  Has increased times to 20 minutes per machine and increased METS from 1.7 to 4.2 in past 36 sessions.  -introduce weights/ therabands 2-4# for 5-10 reps Patient is currently using 8# weights and 4 resistance for 10-15 reps. -manage BP better-BP remains low but well tolerated. -improved cholesterol and  Triglycerides-Last results on file are from 7/2018 and are WNL. Patient has been given heart healthy diet info. -develop regular exercise 30 min daily Continues to  Walk on own @ 35 minutes daily. Denied problems with this. Patient plans to return for Phase III cardiac rehab program. Order received from Dr. Yusuf Manley for this.     Physician Changes/Comments:      Cardiac Rehab Staff

## 2020-01-23 ENCOUNTER — HOSPITAL ENCOUNTER (OUTPATIENT)
Dept: CARDIAC REHAB | Age: 80
Setting detail: THERAPIES SERIES
End: 2020-01-23
Payer: MEDICARE

## 2020-01-23 ENCOUNTER — HOSPITAL ENCOUNTER (OUTPATIENT)
Dept: CARDIAC REHAB | Age: 80
Discharge: HOME | End: 2020-01-23

## 2020-01-27 ENCOUNTER — APPOINTMENT (OUTPATIENT)
Dept: CARDIAC REHAB | Age: 80
End: 2020-01-27
Payer: MEDICARE

## 2020-01-27 ENCOUNTER — HOSPITAL ENCOUNTER (OUTPATIENT)
Dept: CARDIAC REHAB | Age: 80
Discharge: HOME | End: 2020-01-27

## 2020-01-29 ENCOUNTER — APPOINTMENT (OUTPATIENT)
Dept: CARDIAC REHAB | Age: 80
End: 2020-01-29
Payer: MEDICARE

## 2020-01-30 ENCOUNTER — HOSPITAL ENCOUNTER (OUTPATIENT)
Dept: CARDIAC REHAB | Age: 80
Discharge: HOME | End: 2020-01-30

## 2020-01-30 ENCOUNTER — HOSPITAL ENCOUNTER (OUTPATIENT)
Dept: PHARMACY | Age: 80
Setting detail: THERAPIES SERIES
Discharge: HOME | End: 2020-01-30
Payer: MEDICARE

## 2020-01-30 ENCOUNTER — APPOINTMENT (OUTPATIENT)
Dept: CARDIAC REHAB | Age: 80
End: 2020-01-30
Payer: MEDICARE

## 2020-01-30 VITALS
WEIGHT: 160 LBS | HEART RATE: 73 BPM | DIASTOLIC BLOOD PRESSURE: 90 MMHG | SYSTOLIC BLOOD PRESSURE: 119 MMHG | BODY MASS INDEX: 21.7 KG/M2

## 2020-01-30 LAB — INR BLD: 2.2

## 2020-01-30 PROCEDURE — 85610 PROTHROMBIN TIME: CPT

## 2020-01-30 PROCEDURE — 99211 OFF/OP EST MAY X REQ PHY/QHP: CPT

## 2020-02-03 ENCOUNTER — HOSPITAL ENCOUNTER (OUTPATIENT)
Dept: CARDIAC REHAB | Age: 80
Discharge: HOME | End: 2020-02-03

## 2020-02-05 ENCOUNTER — HOSPITAL ENCOUNTER (OUTPATIENT)
Dept: CARDIAC REHAB | Age: 80
Discharge: HOME | End: 2020-02-05

## 2020-02-24 ENCOUNTER — HOSPITAL ENCOUNTER (OUTPATIENT)
Dept: CARDIAC REHAB | Age: 80
Discharge: HOME | End: 2020-02-24

## 2020-02-26 ENCOUNTER — HOSPITAL ENCOUNTER (OUTPATIENT)
Dept: CARDIAC REHAB | Age: 80
Discharge: HOME | End: 2020-02-26

## 2020-02-27 ENCOUNTER — HOSPITAL ENCOUNTER (OUTPATIENT)
Dept: CARDIAC REHAB | Age: 80
Discharge: HOME | End: 2020-02-27

## 2020-02-27 ENCOUNTER — HOSPITAL ENCOUNTER (OUTPATIENT)
Dept: PHARMACY | Age: 80
Setting detail: THERAPIES SERIES
Discharge: HOME | End: 2020-02-27
Payer: MEDICARE

## 2020-02-27 VITALS
SYSTOLIC BLOOD PRESSURE: 123 MMHG | BODY MASS INDEX: 21.43 KG/M2 | WEIGHT: 158 LBS | HEART RATE: 76 BPM | DIASTOLIC BLOOD PRESSURE: 73 MMHG

## 2020-02-27 LAB — INR BLD: 2

## 2020-02-27 PROCEDURE — 99211 OFF/OP EST MAY X REQ PHY/QHP: CPT

## 2020-02-27 PROCEDURE — 85610 PROTHROMBIN TIME: CPT

## 2020-02-27 NOTE — PATIENT INSTRUCTIONS
Continue to monitor urine and stool. Continue to monitor for signs of bleeding. Return to clinic in 4 weeks. Continue warfarin dosage of 2 tablets for 8 mg on Monday, Wednesday and Fridays and 1.5 tablets for 6 mg all other days.

## 2020-03-04 ENCOUNTER — HOSPITAL ENCOUNTER (OUTPATIENT)
Dept: CARDIAC REHAB | Age: 80
Discharge: HOME | End: 2020-03-04

## 2020-03-09 ENCOUNTER — HOSPITAL ENCOUNTER (OUTPATIENT)
Dept: CARDIAC REHAB | Age: 80
Discharge: HOME | End: 2020-03-09

## 2020-03-09 PROCEDURE — 93798 PHYS/QHP OP CAR RHAB W/ECG: CPT

## 2020-03-11 ENCOUNTER — HOSPITAL ENCOUNTER (OUTPATIENT)
Dept: CARDIAC REHAB | Age: 80
Discharge: HOME | End: 2020-03-11

## 2020-03-12 ENCOUNTER — HOSPITAL ENCOUNTER (OUTPATIENT)
Dept: CARDIAC REHAB | Age: 80
Discharge: HOME | End: 2020-03-12

## 2020-03-25 ENCOUNTER — TELEPHONE (OUTPATIENT)
Dept: PHARMACY | Age: 80
End: 2020-03-25

## 2020-03-25 NOTE — TELEPHONE ENCOUNTER
Recent Travel Screening and Travel History documentation:     Travel Screening       Question Response     Do you have any of the following symptoms? None of these     In the last month, have you been in contact with someone who was confirmed or suspected to have Coronavirus / COVID-19? No / Unsure     Have you traveled internationally in the last month?  No      Travel History   Travel since 02/25/20     No documented travel since 02/25/20         Patient understands to go to lab and then expect phone call from clinic

## 2020-03-26 ENCOUNTER — HOSPITAL ENCOUNTER (OUTPATIENT)
Dept: PHARMACY | Age: 80
Setting detail: THERAPIES SERIES
Discharge: HOME | End: 2020-03-26
Payer: MEDICARE

## 2020-03-26 ENCOUNTER — HOSPITAL ENCOUNTER (OUTPATIENT)
Age: 80
Discharge: HOME | End: 2020-03-26
Payer: MEDICARE

## 2020-03-26 LAB
INR REFERENCE RANGE:: ABNORMAL
POC INR: 1.6 (ref 0.9–1.2)

## 2020-03-26 PROCEDURE — 85610 PROTHROMBIN TIME: CPT

## 2020-03-26 PROCEDURE — 99211 OFF/OP EST MAY X REQ PHY/QHP: CPT

## 2020-04-16 ENCOUNTER — TELEPHONE (OUTPATIENT)
Dept: PHARMACY | Age: 80
End: 2020-04-16

## 2020-04-17 ENCOUNTER — HOSPITAL ENCOUNTER (OUTPATIENT)
Dept: PHARMACY | Age: 80
Setting detail: THERAPIES SERIES
Discharge: HOME | End: 2020-04-17
Payer: MEDICARE

## 2020-04-17 LAB
INR REFERENCE RANGE:: ABNORMAL
POC INR: 2.7 (ref 0.9–1.2)

## 2020-04-17 PROCEDURE — 99211 OFF/OP EST MAY X REQ PHY/QHP: CPT

## 2020-04-17 PROCEDURE — 85610 PROTHROMBIN TIME: CPT

## 2020-05-28 ENCOUNTER — TELEPHONE (OUTPATIENT)
Dept: PHARMACY | Age: 80
End: 2020-05-28

## 2020-05-29 ENCOUNTER — HOSPITAL ENCOUNTER (OUTPATIENT)
Dept: PHARMACY | Age: 80
Setting detail: THERAPIES SERIES
Discharge: HOME | End: 2020-05-29
Payer: MEDICARE

## 2020-05-29 VITALS — TEMPERATURE: 98.2 F

## 2020-05-29 LAB — INR BLD: 2.7

## 2020-05-29 PROCEDURE — 99211 OFF/OP EST MAY X REQ PHY/QHP: CPT

## 2020-05-29 PROCEDURE — 85610 PROTHROMBIN TIME: CPT

## 2020-06-17 ENCOUNTER — HOSPITAL ENCOUNTER (OUTPATIENT)
Dept: GENERAL RADIOLOGY | Age: 80
Discharge: HOME | End: 2020-06-19
Payer: MEDICARE

## 2020-06-17 ENCOUNTER — HOSPITAL ENCOUNTER (OUTPATIENT)
Age: 80
Discharge: HOME | End: 2020-06-19
Payer: MEDICARE

## 2020-06-17 PROCEDURE — 73552 X-RAY EXAM OF FEMUR 2/>: CPT

## 2020-06-17 PROCEDURE — 71100 X-RAY EXAM RIBS UNI 2 VIEWS: CPT

## 2020-06-17 PROCEDURE — 73502 X-RAY EXAM HIP UNI 2-3 VIEWS: CPT

## 2020-06-29 ENCOUNTER — HOSPITAL ENCOUNTER (OUTPATIENT)
Dept: PHARMACY | Age: 80
Setting detail: THERAPIES SERIES
Discharge: HOME | End: 2020-06-29
Payer: MEDICARE

## 2020-06-29 VITALS — TEMPERATURE: 98.8 F

## 2020-06-29 LAB — INR BLD: 2.8

## 2020-06-29 PROCEDURE — 85610 PROTHROMBIN TIME: CPT

## 2020-06-29 PROCEDURE — 99211 OFF/OP EST MAY X REQ PHY/QHP: CPT

## 2020-06-29 RX ORDER — HYDROCODONE BITARTRATE AND ACETAMINOPHEN 5; 325 MG/1; MG/1
2 TABLET ORAL 3 TIMES DAILY PRN
COMMUNITY
End: 2020-11-23 | Stop reason: ALTCHOICE

## 2020-07-24 ENCOUNTER — TELEPHONE (OUTPATIENT)
Dept: PHARMACY | Age: 80
End: 2020-07-24

## 2020-07-24 NOTE — TELEPHONE ENCOUNTER
Recent Travel Screening and Travel History documentation:     Travel Screening     Question   Response    In the last month, have you been in contact with someone who was confirmed or suspected to have Coronavirus / COVID-19? No / Unsure    Do you have any of the following symptoms? None of these    Have you traveled internationally in the last month? No      Travel History   Travel since 06/24/20     No documented travel since 06/24/20         Patient denies S/S covid/travel screen. Provided instruction for curbside INR check/appointment. Patient states understanding.

## 2020-07-27 ENCOUNTER — HOSPITAL ENCOUNTER (OUTPATIENT)
Dept: PHARMACY | Age: 80
Setting detail: THERAPIES SERIES
Discharge: HOME | End: 2020-07-27
Payer: MEDICARE

## 2020-07-27 VITALS — TEMPERATURE: 97.2 F

## 2020-07-27 LAB — INR BLD: 2

## 2020-07-27 PROCEDURE — 99211 OFF/OP EST MAY X REQ PHY/QHP: CPT

## 2020-07-27 PROCEDURE — 85610 PROTHROMBIN TIME: CPT

## 2020-07-27 NOTE — PROGRESS NOTES
Patient states no visible blood in urine and no black tarry stool.  No change in other medications.  Will return to clinic in [place] will continue warfarin dosage of 2 tablets for 8 mg on Monday, Wednesday and Fridays and 1.5 tablets for 6 mg all other days. Saw patient TALYA. CLINICAL PHARMACY CONSULT: MED RECONCILIATION/REVIEW ADDENDUM    For Pharmacy Admin Tracking Only    PHSO: No  Total # of Interventions Recommended: 0    - Maintenance Safety Lab Monitoring #: 1  Recommended intervention potential cost savings:   Accepted intervention potential cost savings:    Total Interventions Accepted: 1  Time Spent (min): 30    Figueroa Warner, PharmD

## 2020-08-28 ENCOUNTER — TELEPHONE (OUTPATIENT)
Dept: PHARMACY | Age: 80
End: 2020-08-28

## 2020-08-28 NOTE — TELEPHONE ENCOUNTER
Recent Travel Screening and Travel History documentation:     Travel Screening     Question   Response    In the last month, have you been in contact with someone who was confirmed or suspected to have Coronavirus / COVID-19? No / Unsure    Do you have any of the following symptoms? None of these    Have you traveled internationally in the last month? No      Travel History   Travel since 07/28/20     No documented travel since 07/28/20         Patient denies s/s covid/travel screen. Provided instruction for curbside INR check/appointment. Patient states understanding.

## 2020-08-31 ENCOUNTER — HOSPITAL ENCOUNTER (OUTPATIENT)
Dept: PHARMACY | Age: 80
Setting detail: THERAPIES SERIES
Discharge: HOME | End: 2020-08-31
Payer: MEDICARE

## 2020-08-31 VITALS — TEMPERATURE: 97.9 F

## 2020-08-31 LAB — INR BLD: 2.5

## 2020-08-31 PROCEDURE — 85610 PROTHROMBIN TIME: CPT

## 2020-08-31 PROCEDURE — 99211 OFF/OP EST MAY X REQ PHY/QHP: CPT

## 2020-08-31 NOTE — PROGRESS NOTES
Patient states no visible blood in urine and no black tarry stool.  No change in other medications.  Will return to clinic in 6 weeks.   Patient will continue warfarin dosage of 2 tablets for 8 mg on Monday, Wednesday and Fridays and 1.5 tablets for 6 mg all other days.  Saw patient TALYA. CLINICAL PHARMACY CONSULT: MED RECONCILIATION/REVIEW ADDENDUM    For Pharmacy Admin Tracking Only    PHSO: No  Total # of Interventions Recommended: 0    - Maintenance Safety Lab Monitoring #: 1  Recommended intervention potential cost savings:   Accepted intervention potential cost savings:    Total Interventions Accepted: 1  Time Spent (min): 30    Waleska Suarez, KieranD

## 2020-08-31 NOTE — PATIENT INSTRUCTIONS
Continue to monitor urine and stool. Continue to monitor for signs of bleeding. Return to clinic in 6 weeks. Continue warfarin dosage of 2 tablets for 8 mg on Monday, Wednesday and Fridays and 1.5 tablets for 6 mg all other days.

## 2020-10-09 ENCOUNTER — TELEPHONE (OUTPATIENT)
Dept: PHARMACY | Age: 80
End: 2020-10-09

## 2020-10-09 NOTE — TELEPHONE ENCOUNTER
Recent Travel Screening and Travel History documentation:     Travel Screening     Question   Response    In the last month, have you been in contact with someone who was confirmed or suspected to have Coronavirus / COVID-19? No / Unsure    Have you had a COVID-19 viral test in the last 14 days? No    Do you have any of the following new or worsening symptoms? None of these    Have you traveled internationally in the last month? No      Travel History   Travel since 09/09/20     No documented travel since 09/09/20         Patient denies s/s covid/travel screen. Provided instruction for [facility] INR check/appointment. Patient states understanding.

## 2020-10-12 ENCOUNTER — HOSPITAL ENCOUNTER (OUTPATIENT)
Dept: PHARMACY | Age: 80
Setting detail: THERAPIES SERIES
Discharge: HOME | End: 2020-10-12
Payer: MEDICARE

## 2020-10-12 VITALS — TEMPERATURE: 97.2 F

## 2020-10-12 LAB — INR BLD: 2.4

## 2020-10-12 PROCEDURE — 99211 OFF/OP EST MAY X REQ PHY/QHP: CPT

## 2020-10-12 PROCEDURE — 85610 PROTHROMBIN TIME: CPT

## 2020-10-12 NOTE — PROGRESS NOTES
[address] [address] [facility]/[place]  Medication Management  ANTICOAGULATION    Referring Doctor:  Hilda oRe INR: 2-3    TODAY'S INR: 2.4    WARFARIN Dosage: Warfarin 8 mg MWF; 6 mg all other days    Saw patient TALYA. INR (no units)   Date Value   10/12/2020 2.4   08/31/2020 2.5   07/27/2020 2   06/29/2020 2.8   05/29/2020 2.7   02/27/2020 2   01/30/2020 2.2     POC INR (no units)   Date Value   04/17/2020 2.7 (H)   03/26/2020 1.6 (H)     INR Reference Range: (no units)   Date Value   04/17/2020 03/26/2020            Hemoglobin (g/dL)   Date Value   07/31/2019 13.2   05/14/2012 9.0 (L)   05/08/2012 8.7 (L)     Hematocrit (%)   Date Value   07/31/2019 41.6   05/14/2012 27.3 (L)   05/08/2012 25.8 (L)     Platelet Count (k/uL)   Date Value   05/14/2012 589 (H)   05/08/2012 211   05/06/2012 134 (L)     Platelets (k/uL)   Date Value   07/31/2019 219         Notes:    Fingerstick INR drawn per clinic protocol. Patient states no visible blood in urine and no black tarry stool. Denies any missed doses of warfarin. Lasix and KCl decreased to every other day - patient believes due to decreased renal function. Will recheck INR in 6 weeks. Patient acknowledges working in consult agreement with pharmacist as referred by his/her physician. CLINICAL PHARMACY CONSULT: MED RECONCILIATION/REVIEW ADDENDUM    For Pharmacy Admin Tracking Only    PHSO: No  Total # of Interventions Recommended: 2  - Decreased Dose #: 2  - Maintenance Safety Lab Monitoring #: 1  Total Interventions Accepted: 3  Time Spent (min): [address] [facility] [address].  [address] [id number]

## 2020-10-12 NOTE — PATIENT INSTRUCTIONS
Continue current dose of warfarin as instructed on dosing calendar provided - 2 tablets for 8 mg on Monday, Wednesday and Fridays and 1.5 tablets for 6 mg all other days. Return to clinic in 6 weeks. Continue to monitor urine and stool for signs and symptoms of bleeding.

## 2020-11-13 ENCOUNTER — HOSPITAL ENCOUNTER (OUTPATIENT)
Dept: CARDIAC REHAB | Age: 80
Discharge: HOME | End: 2020-11-13

## 2020-11-17 ENCOUNTER — HOSPITAL ENCOUNTER (OUTPATIENT)
Dept: CARDIAC REHAB | Age: 80
Discharge: HOME | End: 2020-11-17

## 2020-11-20 ENCOUNTER — HOSPITAL ENCOUNTER (OUTPATIENT)
Dept: CARDIAC REHAB | Age: 80
Discharge: HOME | End: 2020-11-20

## 2020-11-20 PROCEDURE — 9900000059 HC CARDIAC REHAB PHASE 3 - 12 VISITS

## 2020-11-20 PROCEDURE — 9900000065 HC CARDIAC REHAB PHASE 3 - 1 VISIT

## 2020-11-23 ENCOUNTER — HOSPITAL ENCOUNTER (OUTPATIENT)
Dept: PHARMACY | Age: 80
Setting detail: THERAPIES SERIES
Discharge: HOME | End: 2020-11-23
Payer: MEDICARE

## 2020-11-23 VITALS
WEIGHT: 164 LBS | BODY MASS INDEX: 22.24 KG/M2 | TEMPERATURE: 97.6 F | DIASTOLIC BLOOD PRESSURE: 74 MMHG | SYSTOLIC BLOOD PRESSURE: 135 MMHG | HEART RATE: 73 BPM

## 2020-11-23 LAB — INR BLD: 2.7

## 2020-11-23 PROCEDURE — 85610 PROTHROMBIN TIME: CPT

## 2020-11-23 PROCEDURE — 99211 OFF/OP EST MAY X REQ PHY/QHP: CPT

## 2020-11-23 RX ORDER — MEXILETINE HYDROCHLORIDE 200 MG/1
200 CAPSULE ORAL 2 TIMES DAILY
COMMUNITY
Start: 2020-09-28 | End: 2021-11-22 | Stop reason: ALTCHOICE

## 2020-11-23 RX ORDER — ALLOPURINOL 100 MG/1
100 TABLET ORAL DAILY
COMMUNITY
Start: 2020-09-15

## 2020-11-23 NOTE — PROGRESS NOTES
[address] [facility]/[place]  Medication Management  ANTICOAGULATION    Referring Doctor: Ki Coppola INR: 2-3    TODAY'S INR: 2.7    WARFARIN Dosage: Warfarin 8 mg MWF; 6 mg all other days. INR (no units)   Date Value   11/23/2020 2.7   10/12/2020 2.4   08/31/2020 2.5   07/27/2020 2   06/29/2020 2.8   05/29/2020 2.7   02/27/2020 2     POC INR (no units)   Date Value   04/17/2020 2.7 (H)   03/26/2020 1.6 (H)     INR Reference Range: (no units)   Date Value   04/17/2020 03/26/2020            Hemoglobin (g/dL)   Date Value   07/31/2019 13.2   05/14/2012 9.0 (L)   05/08/2012 8.7 (L)     Hematocrit (%)   Date Value   07/31/2019 41.6   05/14/2012 27.3 (L)   05/08/2012 25.8 (L)     Platelet Count (k/uL)   Date Value   05/14/2012 589 (H)   05/08/2012 211   05/06/2012 134 (L)     Platelets (k/uL)   Date Value   07/31/2019 219     Significant Drug-Drug Interactions:  Decrease Lasix and potassium to every 3 days. Mexitil changed to 200 mg BID. Notes:    Fingerstick INR drawn per clinic protocol. Patient states no visible blood in urine and no black tarry stool. Denies any missed doses of warfarin. No change in other maintenance medications or in diet. Will recheck INR in 6 weeks. Patient acknowledges working in consult agreement with pharmacist as referred by his/her physician. CLINICAL PHARMACY CONSULT: MED RECONCILIATION/REVIEW ADDENDUM    For Pharmacy Admin Tracking Only    PHSO: No  Total # of Interventions Recommended: 3  - Decreased Dose #: 3  - Maintenance Safety Lab Monitoring #: 1  Recommended intervention potential cost savings:   Accepted intervention potential cost savings:    Total Interventions Accepted: 4  Time Spent (min): 30    Neil Cronin PharmD

## 2020-11-23 NOTE — PATIENT INSTRUCTIONS
Continue to monitor urine and stool. Continue to monitor for signs of bleeding. Return to clinic in 6 weeks.   Warfarin 8 mg M,W,F and 6 mg all other days

## 2020-11-24 ENCOUNTER — HOSPITAL ENCOUNTER (OUTPATIENT)
Dept: CARDIAC REHAB | Age: 80
Discharge: HOME | End: 2020-11-24

## 2020-12-01 ENCOUNTER — HOSPITAL ENCOUNTER (OUTPATIENT)
Dept: CARDIAC REHAB | Age: 80
Discharge: HOME | End: 2020-12-01

## 2020-12-04 ENCOUNTER — HOSPITAL ENCOUNTER (OUTPATIENT)
Dept: CARDIAC REHAB | Age: 80
Discharge: HOME | End: 2020-12-04

## 2020-12-08 ENCOUNTER — HOSPITAL ENCOUNTER (OUTPATIENT)
Dept: CARDIAC REHAB | Age: 80
Discharge: HOME | End: 2020-12-08

## 2020-12-11 ENCOUNTER — HOSPITAL ENCOUNTER (OUTPATIENT)
Dept: CARDIAC REHAB | Age: 80
Discharge: HOME | End: 2020-12-11

## 2020-12-15 ENCOUNTER — HOSPITAL ENCOUNTER (OUTPATIENT)
Dept: CARDIAC REHAB | Age: 80
Discharge: HOME | End: 2020-12-15

## 2020-12-18 ENCOUNTER — HOSPITAL ENCOUNTER (OUTPATIENT)
Dept: CARDIAC REHAB | Age: 80
Discharge: HOME | End: 2020-12-18

## 2020-12-22 ENCOUNTER — HOSPITAL ENCOUNTER (OUTPATIENT)
Dept: CARDIAC REHAB | Age: 80
Discharge: HOME | End: 2020-12-22

## 2020-12-29 ENCOUNTER — HOSPITAL ENCOUNTER (OUTPATIENT)
Dept: CARDIAC REHAB | Age: 80
Discharge: HOME | End: 2020-12-29

## 2021-01-04 ENCOUNTER — HOSPITAL ENCOUNTER (OUTPATIENT)
Dept: PHARMACY | Age: 81
Setting detail: THERAPIES SERIES
Discharge: HOME | End: 2021-01-04
Payer: MEDICARE

## 2021-01-04 VITALS
DIASTOLIC BLOOD PRESSURE: 64 MMHG | WEIGHT: 166 LBS | HEART RATE: 72 BPM | SYSTOLIC BLOOD PRESSURE: 109 MMHG | TEMPERATURE: 97.7 F | BODY MASS INDEX: 22.51 KG/M2

## 2021-01-04 DIAGNOSIS — Z79.01 LONG TERM CURRENT USE OF ANTICOAGULANT THERAPY: ICD-10-CM

## 2021-01-04 LAB — INR BLD: 4.3

## 2021-01-04 PROCEDURE — 85610 PROTHROMBIN TIME: CPT

## 2021-01-04 PROCEDURE — 99212 OFFICE O/P EST SF 10 MIN: CPT

## 2021-01-04 NOTE — PATIENT INSTRUCTIONS
Continue to monitor urine and stool. Continue to monitor for signs of bleeding. Return to clinic in 1.5 weeks. Hold warfarin today then take half tablet for 2 mg tomorrow then decrease dosage to 2 tablets for 8 mg on Mon, Fri and 1.5 tablets all other days.

## 2021-01-05 ENCOUNTER — HOSPITAL ENCOUNTER (OUTPATIENT)
Dept: CARDIAC REHAB | Age: 81
Discharge: HOME | End: 2021-01-05

## 2021-01-08 ENCOUNTER — HOSPITAL ENCOUNTER (OUTPATIENT)
Dept: CARDIAC REHAB | Age: 81
Discharge: HOME | End: 2021-01-08

## 2021-01-12 ENCOUNTER — HOSPITAL ENCOUNTER (OUTPATIENT)
Dept: CARDIAC REHAB | Age: 81
Discharge: HOME | End: 2021-01-12

## 2021-01-14 ENCOUNTER — TELEPHONE (OUTPATIENT)
Dept: PHARMACY | Age: 81
End: 2021-01-14

## 2021-01-14 NOTE — TELEPHONE ENCOUNTER

## 2021-01-15 ENCOUNTER — HOSPITAL ENCOUNTER (OUTPATIENT)
Dept: PHARMACY | Age: 81
Setting detail: THERAPIES SERIES
Discharge: HOME | End: 2021-01-15
Payer: MEDICARE

## 2021-01-15 ENCOUNTER — HOSPITAL ENCOUNTER (OUTPATIENT)
Dept: CARDIAC REHAB | Age: 81
Discharge: HOME | End: 2021-01-15

## 2021-01-15 VITALS
TEMPERATURE: 97.8 F | DIASTOLIC BLOOD PRESSURE: 73 MMHG | SYSTOLIC BLOOD PRESSURE: 122 MMHG | WEIGHT: 164 LBS | BODY MASS INDEX: 22.24 KG/M2 | HEART RATE: 73 BPM

## 2021-01-15 DIAGNOSIS — Z79.01 LONG TERM CURRENT USE OF ANTICOAGULANT THERAPY: ICD-10-CM

## 2021-01-15 LAB — INR BLD: 2.7

## 2021-01-15 PROCEDURE — 99211 OFF/OP EST MAY X REQ PHY/QHP: CPT

## 2021-01-15 PROCEDURE — 85610 PROTHROMBIN TIME: CPT

## 2021-01-15 NOTE — PATIENT INSTRUCTIONS
Continue to monitor urine and stool. Continue to monitor for signs of bleeding. Return to clinic in 3 weeks. Continue warfarin 2 tablets for 8 mg on M,F and 1.5 tablets for 6 mg all other days.

## 2021-01-15 NOTE — PROGRESS NOTES
[facility] [facility]/[place]  Medication Management  ANTICOAGULATION    Referring Doctor: Freda Porter    GOAL INR: 2-3    TODAY'S INR: 2.7    WARFARIN Dosage: Continue warfarin 2 tablets for 8 mg on M,F and 1.5 tablets for 6 mg all other days. INR (no units)   Date Value   01/04/2021 4.3   11/23/2020 2.7   10/12/2020 2.4   08/31/2020 2.5   07/27/2020 2   06/29/2020 2.8   05/29/2020 2.7       Notes:    Fingerstick INR drawn per clinic protocol. Patient states no visible blood in urine and no black tarry stool. Denies any missed doses of warfarin. No change in other maintenance medications or in diet. Will recheck INR in 3 weeks. Patient acknowledges working in consult agreement with pharmacist as referred by his/her physician. CLINICAL PHARMACY CONSULT: MED RECONCILIATION/REVIEW ADDENDUM    For Pharmacy Admin Tracking Only    PHSO: No  Total # of Interventions Recommended: 0    - Maintenance Safety Lab Monitoring #: 1  Recommended intervention potential cost savings:   Accepted intervention potential cost savings:    Total Interventions Accepted: 1  Time Spent (min): 30    Gabriel Clay, KieranD

## 2021-01-19 ENCOUNTER — HOSPITAL ENCOUNTER (OUTPATIENT)
Dept: CARDIAC REHAB | Age: 81
Discharge: HOME | End: 2021-01-19

## 2021-01-22 ENCOUNTER — HOSPITAL ENCOUNTER (OUTPATIENT)
Dept: CARDIAC REHAB | Age: 81
Discharge: HOME | End: 2021-01-22

## 2021-01-26 ENCOUNTER — HOSPITAL ENCOUNTER (OUTPATIENT)
Dept: CARDIAC REHAB | Age: 81
Discharge: HOME | End: 2021-01-26

## 2021-01-29 ENCOUNTER — HOSPITAL ENCOUNTER (OUTPATIENT)
Dept: CARDIAC REHAB | Age: 81
Discharge: HOME | End: 2021-01-29

## 2021-02-02 ENCOUNTER — HOSPITAL ENCOUNTER (OUTPATIENT)
Dept: CARDIAC REHAB | Age: 81
Discharge: HOME | End: 2021-02-02

## 2021-02-04 ENCOUNTER — TELEPHONE (OUTPATIENT)
Dept: PHARMACY | Age: 81
End: 2021-02-04

## 2021-02-04 NOTE — TELEPHONE ENCOUNTER

## 2021-02-05 ENCOUNTER — HOSPITAL ENCOUNTER (OUTPATIENT)
Dept: CARDIAC REHAB | Age: 81
Discharge: HOME | End: 2021-02-05

## 2021-02-05 ENCOUNTER — HOSPITAL ENCOUNTER (OUTPATIENT)
Dept: PHARMACY | Age: 81
Setting detail: THERAPIES SERIES
Discharge: HOME | End: 2021-02-05
Payer: MEDICARE

## 2021-02-05 VITALS
HEART RATE: 68 BPM | WEIGHT: 160 LBS | SYSTOLIC BLOOD PRESSURE: 103 MMHG | TEMPERATURE: 97.8 F | DIASTOLIC BLOOD PRESSURE: 71 MMHG | BODY MASS INDEX: 21.7 KG/M2

## 2021-02-05 DIAGNOSIS — Z79.01 LONG TERM CURRENT USE OF ANTICOAGULANT THERAPY: ICD-10-CM

## 2021-02-05 LAB — INR BLD: 3.3

## 2021-02-05 PROCEDURE — 85610 PROTHROMBIN TIME: CPT

## 2021-02-05 PROCEDURE — 99212 OFFICE O/P EST SF 10 MIN: CPT

## 2021-02-05 NOTE — PROGRESS NOTES
[facility]  Medication Management  ANTICOAGULATION    Referring Doctor: Elvira Burdick    GOAL INR: 2-3    TODAY'S INR: 3.3    WARFARIN Dosage: Hold warfarin today then continue warfarin 2 tablets for 8 mg on M,F and 1.5 tablets for 6 mg all other days. INR (no units)   Date Value   01/15/2021 2.7   01/04/2021 4.3   11/23/2020 2.7   10/12/2020 2.4   08/31/2020 2.5   07/27/2020 2   06/29/2020 2.8     Notes:    Fingerstick INR drawn per clinic protocol. Patient states no visible blood in urine and no black tarry stool. Denies any missed doses of warfarin. No change in other maintenance medications or in diet. Will recheck INR in 3 weeks. Patient acknowledges working in consult agreement with pharmacist as referred by his/her physician. Patient has been eating less salads and will start eating more salads again. CLINICAL PHARMACY CONSULT: MED RECONCILIATION/REVIEW ADDENDUM    For Pharmacy Admin Tracking Only    PHSO: No  Total # of Interventions Recommended: 1  - Decreased Dose #: 1  - Maintenance Safety Lab Monitoring #: 1  Recommended intervention potential cost savings:   Accepted intervention potential cost savings:    Total Interventions Accepted: 2  Time Spent (min): 30    Aparna Mas PharmD

## 2021-02-05 NOTE — PATIENT INSTRUCTIONS
Continue to monitor urine and stool. Continue to monitor for signs of bleeding. Return to clinic in 3 weeks. Hold warfarin today then continue warfarin 2 tablets for 8 mg on M,F and 1.5 tablets for 6 mg all other days.

## 2021-02-12 ENCOUNTER — HOSPITAL ENCOUNTER (OUTPATIENT)
Dept: CARDIAC REHAB | Age: 81
Discharge: HOME | End: 2021-02-12

## 2021-02-25 ENCOUNTER — TELEPHONE (OUTPATIENT)
Dept: PHARMACY | Age: 81
End: 2021-02-25

## 2021-02-26 ENCOUNTER — HOSPITAL ENCOUNTER (OUTPATIENT)
Dept: PHARMACY | Age: 81
Setting detail: THERAPIES SERIES
Discharge: HOME | End: 2021-02-26
Payer: MEDICARE

## 2021-02-26 ENCOUNTER — HOSPITAL ENCOUNTER (OUTPATIENT)
Dept: CARDIAC REHAB | Age: 81
Discharge: HOME | End: 2021-02-26

## 2021-02-26 VITALS
WEIGHT: 162 LBS | TEMPERATURE: 97.5 F | BODY MASS INDEX: 21.97 KG/M2 | DIASTOLIC BLOOD PRESSURE: 56 MMHG | HEART RATE: 77 BPM | SYSTOLIC BLOOD PRESSURE: 99 MMHG

## 2021-02-26 DIAGNOSIS — Z79.01 LONG TERM CURRENT USE OF ANTICOAGULANT THERAPY: ICD-10-CM

## 2021-02-26 LAB — INR BLD: 3.2

## 2021-02-26 PROCEDURE — 99212 OFFICE O/P EST SF 10 MIN: CPT

## 2021-02-26 PROCEDURE — 85610 PROTHROMBIN TIME: CPT

## 2021-02-26 RX ORDER — POTASSIUM CHLORIDE 750 MG/1
10 TABLET, EXTENDED RELEASE ORAL DAILY
COMMUNITY
Start: 2020-11-20

## 2021-02-26 NOTE — PROGRESS NOTES
[address] [facility]/[place]  Medication Management  ANTICOAGULATION    Referring Doctor: Denise Harding INR: 2-3    TODAY'S INR: 3.2    WARFARIN Dosage: Hold warfarin today then decrease warfarin to 2 tablets for 8 mg on Mondays and 1.5 tablets for 6 mg all other days. INR (no units)   Date Value   02/05/2021 3.3   01/15/2021 2.7   01/04/2021 4.3   11/23/2020 2.7   10/12/2020 2.4   08/31/2020 2.5   07/27/2020 2     Medication changes:  None    Notes:    Fingerstick INR drawn per clinic protocol. Patient states no visible blood in urine and no black tarry stool. Denies any missed doses of warfarin. No change in other maintenance medications or in diet. Will recheck INR in 3 weeks. Patient acknowledges working in consult agreement with pharmacist as referred by his/her physician. CLINICAL PHARMACY CONSULT: MED RECONCILIATION/REVIEW ADDENDUM    For Pharmacy Admin Tracking Only    PHSO: No  Total # of Interventions Recommended: 2  - Decreased Dose #: 2  - Maintenance Safety Lab Monitoring #: 1  Recommended intervention potential cost savings:   Accepted intervention potential cost savings:    Total Interventions Accepted: 3  Time Spent (min): 30    Gabriel Clay, KieranD

## 2021-03-02 ENCOUNTER — HOSPITAL ENCOUNTER (OUTPATIENT)
Dept: CARDIAC REHAB | Age: 81
Discharge: HOME | End: 2021-03-02

## 2021-03-05 ENCOUNTER — HOSPITAL ENCOUNTER (OUTPATIENT)
Dept: CARDIAC REHAB | Age: 81
Discharge: HOME | End: 2021-03-05

## 2021-03-09 ENCOUNTER — HOSPITAL ENCOUNTER (OUTPATIENT)
Dept: CARDIAC REHAB | Age: 81
Discharge: HOME | End: 2021-03-09

## 2021-03-09 PROCEDURE — 9900000065 HC CARDIAC REHAB PHASE 3 - 1 VISIT

## 2021-03-12 ENCOUNTER — HOSPITAL ENCOUNTER (OUTPATIENT)
Dept: CARDIAC REHAB | Age: 81
Discharge: HOME | End: 2021-03-12

## 2021-03-16 ENCOUNTER — HOSPITAL ENCOUNTER (OUTPATIENT)
Dept: CARDIAC REHAB | Age: 81
Discharge: HOME | End: 2021-03-16

## 2021-03-18 ENCOUNTER — TELEPHONE (OUTPATIENT)
Dept: PHARMACY | Age: 81
End: 2021-03-18

## 2021-03-19 ENCOUNTER — HOSPITAL ENCOUNTER (OUTPATIENT)
Dept: CARDIAC REHAB | Age: 81
Discharge: HOME | End: 2021-03-19

## 2021-03-19 ENCOUNTER — HOSPITAL ENCOUNTER (OUTPATIENT)
Dept: PHARMACY | Age: 81
Setting detail: THERAPIES SERIES
Discharge: HOME | End: 2021-03-19
Payer: MEDICARE

## 2021-03-19 VITALS
DIASTOLIC BLOOD PRESSURE: 70 MMHG | TEMPERATURE: 97.1 F | WEIGHT: 166 LBS | SYSTOLIC BLOOD PRESSURE: 117 MMHG | BODY MASS INDEX: 22.51 KG/M2 | HEART RATE: 89 BPM

## 2021-03-19 DIAGNOSIS — I48.91 ATRIAL FIBRILLATION, UNSPECIFIED TYPE (HCC): ICD-10-CM

## 2021-03-19 DIAGNOSIS — Z79.01 LONG TERM CURRENT USE OF ANTICOAGULANT THERAPY: ICD-10-CM

## 2021-03-19 LAB — INR BLD: 2.3

## 2021-03-19 PROCEDURE — 85610 PROTHROMBIN TIME: CPT

## 2021-03-19 PROCEDURE — 99211 OFF/OP EST MAY X REQ PHY/QHP: CPT

## 2021-03-19 NOTE — PROGRESS NOTES
[facility] [facility]/[place]  Medication Management  ANTICOAGULATION    Referring Doctor: Mckayla Thornton INR: 2-3    TODAY'S INR: 2.3    WARFARIN Dosage: Continue warfarin to 2 tablets for 8 mg on Mondays and 1.5 tablets for 6 mg all other days.      INR (no units)   Date Value   02/26/2021 3.2   02/05/2021 3.3   01/15/2021 2.7   01/04/2021 4.3   11/23/2020 2.7   10/12/2020 2.4   08/31/2020 2.5     Medication changes:  none    Notes:    Fingerstick INR drawn per clinic protocol. Patient states no visible blood in urine and no black tarry stool. Denies any missed doses of warfarin. No change in other maintenance medications or in diet. Will recheck INR in 4 weeks. Patient acknowledges working in consult agreement with pharmacist as referred by his/her physician. CLINICAL PHARMACY CONSULT: MED RECONCILIATION/REVIEW ADDENDUM    For Pharmacy Admin Tracking Only    PHSO: No  Total # of Interventions Recommended: 0    - Maintenance Safety Lab Monitoring #: 1  Recommended intervention potential cost savings:   Accepted intervention potential cost savings:    Total Interventions Accepted: 1  Time Spent (min): 30    Warnell Kussmaul, PharmD

## 2021-03-23 ENCOUNTER — HOSPITAL ENCOUNTER (OUTPATIENT)
Dept: CARDIAC REHAB | Age: 81
Discharge: HOME | End: 2021-03-23

## 2021-03-26 ENCOUNTER — HOSPITAL ENCOUNTER (OUTPATIENT)
Dept: CARDIAC REHAB | Age: 81
Discharge: HOME | End: 2021-03-26

## 2021-03-30 ENCOUNTER — HOSPITAL ENCOUNTER (OUTPATIENT)
Dept: CARDIAC REHAB | Age: 81
Discharge: HOME | End: 2021-03-30

## 2021-04-06 ENCOUNTER — HOSPITAL ENCOUNTER (OUTPATIENT)
Age: 81
Discharge: HOME | End: 2021-04-08
Payer: MEDICARE

## 2021-04-06 ENCOUNTER — HOSPITAL ENCOUNTER (OUTPATIENT)
Dept: GENERAL RADIOLOGY | Age: 81
Discharge: HOME | End: 2021-04-08
Payer: MEDICARE

## 2021-04-06 DIAGNOSIS — M54.50 LOW BACK PAIN, UNSPECIFIED BACK PAIN LATERALITY, UNSPECIFIED CHRONICITY, UNSPECIFIED WHETHER SCIATICA PRESENT: ICD-10-CM

## 2021-04-06 DIAGNOSIS — M25.552 LEFT HIP PAIN: ICD-10-CM

## 2021-04-06 PROCEDURE — 72100 X-RAY EXAM L-S SPINE 2/3 VWS: CPT

## 2021-04-06 PROCEDURE — 73502 X-RAY EXAM HIP UNI 2-3 VIEWS: CPT

## 2021-04-06 PROCEDURE — 72170 X-RAY EXAM OF PELVIS: CPT

## 2021-04-06 PROCEDURE — 72220 X-RAY EXAM SACRUM TAILBONE: CPT

## 2021-04-15 ENCOUNTER — TELEPHONE (OUTPATIENT)
Dept: PHARMACY | Age: 81
End: 2021-04-15

## 2021-04-15 NOTE — TELEPHONE ENCOUNTER
Recent Travel Screening and Travel History documentation:     Travel Screening     Question   Response    In the last month, have you been in contact with someone who was confirmed or suspected to have Coronavirus / COVID-19? No / Unsure    Have you had a COVID-19 viral test in the last 14 days? No    Do you have any of the following new or worsening symptoms? None of these    Have you traveled internationally or domestically in the last month?   No      Travel History   Travel since 03/15/21     No documented travel since 03/15/21

## 2021-04-16 ENCOUNTER — HOSPITAL ENCOUNTER (OUTPATIENT)
Dept: PHARMACY | Age: 81
Setting detail: THERAPIES SERIES
Discharge: HOME | End: 2021-04-16
Payer: MEDICARE

## 2021-04-16 VITALS
DIASTOLIC BLOOD PRESSURE: 73 MMHG | BODY MASS INDEX: 22.38 KG/M2 | SYSTOLIC BLOOD PRESSURE: 118 MMHG | HEART RATE: 71 BPM | WEIGHT: 165 LBS

## 2021-04-16 DIAGNOSIS — Z79.01 LONG TERM CURRENT USE OF ANTICOAGULANT THERAPY: ICD-10-CM

## 2021-04-16 LAB — INR BLD: 2.9

## 2021-04-16 PROCEDURE — 85610 PROTHROMBIN TIME: CPT

## 2021-04-16 PROCEDURE — 99211 OFF/OP EST MAY X REQ PHY/QHP: CPT

## 2021-04-16 RX ORDER — TRAMADOL HYDROCHLORIDE 50 MG/1
50 TABLET ORAL EVERY 8 HOURS PRN
COMMUNITY
Start: 2021-04-06 | End: 2021-09-10 | Stop reason: ALTCHOICE

## 2021-04-16 NOTE — PATIENT INSTRUCTIONS
Continue to monitor urine and stool. Continue to monitor for signs of bleeding. Return to clinic in 4 weeks. Continue warfarin to 2 tablets for 8 mg on Mondays and 1.5 tablets for 6 mg all other days.

## 2021-05-13 ENCOUNTER — TELEPHONE (OUTPATIENT)
Dept: PHARMACY | Age: 81
End: 2021-05-13

## 2021-05-14 ENCOUNTER — HOSPITAL ENCOUNTER (OUTPATIENT)
Dept: PHARMACY | Age: 81
Setting detail: THERAPIES SERIES
Discharge: HOME | End: 2021-05-14
Payer: MEDICARE

## 2021-05-14 VITALS — WEIGHT: 167 LBS | BODY MASS INDEX: 22.65 KG/M2

## 2021-05-14 DIAGNOSIS — Z79.01 LONG TERM CURRENT USE OF ANTICOAGULANT THERAPY: ICD-10-CM

## 2021-05-14 LAB — INR BLD: 1.9

## 2021-05-14 PROCEDURE — 99211 OFF/OP EST MAY X REQ PHY/QHP: CPT

## 2021-05-14 PROCEDURE — 85610 PROTHROMBIN TIME: CPT

## 2021-05-14 NOTE — PATIENT INSTRUCTIONS
Please take warfarin 8 mg today. Continue current dose of warfarin as instructed on dosing calendar provided. Return to clinic in 4 weeks. Continue to monitor urine and stool for signs and symptoms of bleeding. Please notify the clinic of any medication changes.

## 2021-05-14 NOTE — PROGRESS NOTES
[address]/[place]  Medication Management  ANTICOAGULATION    Referring Provider: Hola Joya    GOAL INR: 2-3    TODAY'S INR: 1.9    WARFARIN Dosage: Patient will continue warfarin 8 mg every Monday; 6 mg all other days. Patient will take warfarin 8 mg today. INR (no units)   Date Value   05/14/2021 1.9   04/16/2021 2.9   03/19/2021 2.3   02/26/2021 3.2   02/05/2021 3.3   01/15/2021 2.7   01/04/2021 4.3       Notes:    Fingerstick INR drawn per clinic protocol. Patient states no visible blood in urine and no black tarry stool. Denies any missed doses of warfarin. No change in other maintenance medications or in diet. Will recheck INR in 4 weeks. Patient acknowledges working in consult agreement with pharmacist as referred by his/her physician. CLINICAL PHARMACY CONSULT: MED RECONCILIATION/REVIEW ADDENDUM    For Pharmacy Admin Tracking Only     Intervention Detail: Dose Adjustment: 1: reason: Therapy Optimization   Total # of Interventions Recommended: 1   Total # of Interventions Accepted: 1   Time Spent (min): [place]. Glorianne Sacks

## 2021-06-10 ENCOUNTER — TELEPHONE (OUTPATIENT)
Dept: PHARMACY | Age: 81
End: 2021-06-10

## 2021-06-10 NOTE — TELEPHONE ENCOUNTER
Recent Travel Screening and Travel History documentation:     Travel Screening     Question   Response    In the last month, have you been in contact with someone who was confirmed or suspected to have Coronavirus / COVID-19? No / Unsure    Have you had a COVID-19 viral test in the last 14 days? No    Do you have any of the following new or worsening symptoms? None of these    Have you traveled internationally or domestically in the last month?   No      Travel History   Travel since 05/10/21     No documented travel since 05/10/21

## 2021-06-11 ENCOUNTER — HOSPITAL ENCOUNTER (OUTPATIENT)
Dept: PHARMACY | Age: 81
Setting detail: THERAPIES SERIES
Discharge: HOME | End: 2021-06-11
Payer: MEDICARE

## 2021-06-11 VITALS
DIASTOLIC BLOOD PRESSURE: 80 MMHG | BODY MASS INDEX: 22.38 KG/M2 | SYSTOLIC BLOOD PRESSURE: 106 MMHG | WEIGHT: 165 LBS | HEART RATE: 72 BPM

## 2021-06-11 DIAGNOSIS — Z79.01 LONG TERM CURRENT USE OF ANTICOAGULANT THERAPY: ICD-10-CM

## 2021-06-11 DIAGNOSIS — I48.91 ATRIAL FIBRILLATION, UNSPECIFIED TYPE (HCC): Primary | ICD-10-CM

## 2021-06-11 LAB — INR BLD: 2

## 2021-06-11 PROCEDURE — 99211 OFF/OP EST MAY X REQ PHY/QHP: CPT

## 2021-06-11 PROCEDURE — 85610 PROTHROMBIN TIME: CPT

## 2021-06-11 RX ORDER — PENICILLIN V POTASSIUM 500 MG/1
500 TABLET ORAL 4 TIMES DAILY
COMMUNITY
End: 2021-07-09 | Stop reason: ALTCHOICE

## 2021-06-11 NOTE — PROGRESS NOTES
[facility]  Medication Management  ANTICOAGULATION    Referring Provider: Lilia Canada     GOAL INR: 2-3     TODAY'S INR: 2.0     WARFARIN Dosage: Patient will continue warfarin 2 tablets for 8 mg every Monday and 1.5 tablets for 6 mg all other days. INR (no units)   Date Value   2021 2   2021 1.9   2021 2.9   2021 2.3   2021 3.2   2021 3.3   01/15/2021 2.7     Medication changes:  PenVK 500 mg QID for a couple weeks for his tooth implant infection. Notes:    Fingerstick INR drawn per clinic protocol. Patient states no visible blood in urine and no black tarry stool. Denies any missed doses of warfarin. No change in other maintenance medications or in diet. Will recheck INR in 4 weeks. Patient acknowledges working in consult agreement with pharmacist as referred by his/her physician.                   CLINICAL PHARMACY CONSULT: MED RECONCILIATION/REVIEW ADDENDUM    For Pharmacy Admin Tracking Only     Intervention Detail: Adherence Monitorin and New Rx: 1, reason: Needs Additional Therapy   Total # of Interventions Recommended: 1   Total # of Interventions Accepted: 1   Time Spent (min): [address] [facility], [facility], PharmD

## 2021-06-15 ENCOUNTER — HOSPITAL ENCOUNTER (OUTPATIENT)
Dept: CARDIAC REHAB | Age: 81
Setting detail: THERAPIES SERIES
Discharge: HOME | End: 2021-06-15

## 2021-06-18 ENCOUNTER — HOSPITAL ENCOUNTER (OUTPATIENT)
Dept: CARDIAC REHAB | Age: 81
Setting detail: THERAPIES SERIES
Discharge: HOME | End: 2021-06-18

## 2021-06-22 ENCOUNTER — HOSPITAL ENCOUNTER (OUTPATIENT)
Dept: CARDIAC REHAB | Age: 81
Setting detail: THERAPIES SERIES
Discharge: HOME | End: 2021-06-22

## 2021-06-25 ENCOUNTER — HOSPITAL ENCOUNTER (OUTPATIENT)
Dept: CARDIAC REHAB | Age: 81
Setting detail: THERAPIES SERIES
Discharge: HOME | End: 2021-06-25

## 2021-06-29 ENCOUNTER — HOSPITAL ENCOUNTER (OUTPATIENT)
Dept: CARDIAC REHAB | Age: 81
Setting detail: THERAPIES SERIES
Discharge: HOME | End: 2021-06-29

## 2021-07-02 ENCOUNTER — HOSPITAL ENCOUNTER (OUTPATIENT)
Dept: CARDIAC REHAB | Age: 81
Setting detail: THERAPIES SERIES
Discharge: HOME | End: 2021-07-02
Payer: MEDICARE

## 2021-07-06 ENCOUNTER — HOSPITAL ENCOUNTER (OUTPATIENT)
Dept: CARDIAC REHAB | Age: 81
Setting detail: THERAPIES SERIES
Discharge: HOME | End: 2021-07-06
Payer: MEDICARE

## 2021-07-08 ENCOUNTER — TELEPHONE (OUTPATIENT)
Dept: PHARMACY | Age: 81
End: 2021-07-08

## 2021-07-08 NOTE — TELEPHONE ENCOUNTER
COVID-19 phone screening     Call placed to screen patient prior to upcoming Medication Management visit for Anticoagulation on 7/9/21. Does patient have any of the following symptoms? [] Fever    [] Lower respiratory symptoms (SOB, difficulty breathing, cough)  [x] None    Travel Screening completed. Patient instructed to wear mask during visit.

## 2021-07-09 ENCOUNTER — HOSPITAL ENCOUNTER (OUTPATIENT)
Dept: PHARMACY | Age: 81
Setting detail: THERAPIES SERIES
Discharge: HOME | End: 2021-07-09
Payer: MEDICARE

## 2021-07-09 ENCOUNTER — HOSPITAL ENCOUNTER (OUTPATIENT)
Dept: CARDIAC REHAB | Age: 81
Setting detail: THERAPIES SERIES
Discharge: HOME | End: 2021-07-09
Payer: MEDICARE

## 2021-07-09 VITALS
BODY MASS INDEX: 22.11 KG/M2 | HEART RATE: 73 BPM | WEIGHT: 163 LBS | DIASTOLIC BLOOD PRESSURE: 57 MMHG | SYSTOLIC BLOOD PRESSURE: 106 MMHG

## 2021-07-09 DIAGNOSIS — Z79.01 LONG TERM CURRENT USE OF ANTICOAGULANT THERAPY: ICD-10-CM

## 2021-07-09 DIAGNOSIS — I48.91 ATRIAL FIBRILLATION, UNSPECIFIED TYPE (HCC): Primary | ICD-10-CM

## 2021-07-09 LAB — INR BLD: 1.9

## 2021-07-09 PROCEDURE — 85610 PROTHROMBIN TIME: CPT

## 2021-07-09 PROCEDURE — 99212 OFFICE O/P EST SF 10 MIN: CPT

## 2021-07-09 NOTE — PROGRESS NOTES
[facility] [address] [facility]/[place]  Medication Management  ANTICOAGULATION    Referring Provider: Alberto Hollins     GOAL INR: 2-3     TODAY'S INR: 1.9     WARFARIN Dosage: Take warfarin 2 tablets for 8 mg today then continue warfarin 2 tablets for 8 mg every Monday and 1.5 tablets for 6 mg all other days. INR (no units)   Date Value   2021 2   2021 1.9   2021 2.9   2021 2.3   2021 3.2   2021 3.3   01/15/2021 2.7     Medication changes:  Finished PenVK a couple weeks ago  Metoprolol Succinate doubled to 50 mg daily    Notes:    Fingerstick INR drawn per clinic protocol. Patient states no visible blood in urine and no black tarry stool. Denies any missed doses of warfarin. No change in other maintenance medications or in diet. Will recheck INR in 4 weeks. Patient acknowledges working in consult agreement with pharmacist as referred by his/her physician.                   For Pharmacy Admin Tracking Only     Intervention Detail: Adherence Monitorin, Dose Adjustment: 1, reason: Therapy Optimization and New Rx: 2, reason: Needs Additional Therapy   Total # of Interventions Recommended: 3   Total # of Interventions Accepted: 3   Time Spent (min): [address] [facility], [facility], PharmD

## 2021-07-09 NOTE — PATIENT INSTRUCTIONS
Continue to monitor urine and stool. Continue to monitor for signs of bleeding. Return to clinic in 4 weeks. Take warfarin 2 tablets for 8 mg today then continue warfarin 2 tablets for 8 mg every Monday and 1.5 tablets for 6 mg all other days.

## 2021-07-13 ENCOUNTER — HOSPITAL ENCOUNTER (OUTPATIENT)
Dept: CARDIAC REHAB | Age: 81
Setting detail: THERAPIES SERIES
Discharge: HOME | End: 2021-07-13
Payer: MEDICARE

## 2021-07-16 ENCOUNTER — HOSPITAL ENCOUNTER (OUTPATIENT)
Dept: CARDIAC REHAB | Age: 81
Setting detail: THERAPIES SERIES
Discharge: HOME | End: 2021-07-16
Payer: MEDICARE

## 2021-07-27 ENCOUNTER — HOSPITAL ENCOUNTER (OUTPATIENT)
Dept: CARDIAC REHAB | Age: 81
Discharge: HOME | End: 2021-07-27

## 2021-07-30 ENCOUNTER — HOSPITAL ENCOUNTER (OUTPATIENT)
Dept: CARDIAC REHAB | Age: 81
Setting detail: THERAPIES SERIES
Discharge: HOME | End: 2021-07-30
Payer: MEDICARE

## 2021-08-03 ENCOUNTER — HOSPITAL ENCOUNTER (OUTPATIENT)
Dept: CARDIAC REHAB | Age: 81
Discharge: HOME | End: 2021-08-03

## 2021-08-05 ENCOUNTER — TELEPHONE (OUTPATIENT)
Dept: PHARMACY | Age: 81
End: 2021-08-05

## 2021-08-06 ENCOUNTER — HOSPITAL ENCOUNTER (OUTPATIENT)
Dept: CARDIAC REHAB | Age: 81
Discharge: HOME | End: 2021-08-06

## 2021-08-06 ENCOUNTER — HOSPITAL ENCOUNTER (OUTPATIENT)
Dept: PHARMACY | Age: 81
Setting detail: THERAPIES SERIES
Discharge: HOME | End: 2021-08-06
Payer: MEDICARE

## 2021-08-06 VITALS
WEIGHT: 165 LBS | BODY MASS INDEX: 22.38 KG/M2 | DIASTOLIC BLOOD PRESSURE: 76 MMHG | SYSTOLIC BLOOD PRESSURE: 114 MMHG | HEART RATE: 71 BPM

## 2021-08-06 DIAGNOSIS — Z79.01 LONG TERM CURRENT USE OF ANTICOAGULANT THERAPY: Primary | ICD-10-CM

## 2021-08-06 LAB — INR BLD: 1.8

## 2021-08-06 PROCEDURE — 99211 OFF/OP EST MAY X REQ PHY/QHP: CPT

## 2021-08-06 PROCEDURE — 85610 PROTHROMBIN TIME: CPT

## 2021-08-06 NOTE — PATIENT INSTRUCTIONS
Continue current dose of warfarin as instructed on dosing calendar provided - 8 mg every Monday and 6 mg all other days. Return to clinic in 4 weeks. Continue to monitor urine and stool for signs and symptoms of bleeding. Please notify the clinic of any medication changes.

## 2021-08-10 ENCOUNTER — HOSPITAL ENCOUNTER (OUTPATIENT)
Dept: CARDIAC REHAB | Age: 81
Discharge: HOME | End: 2021-08-10

## 2021-08-11 ENCOUNTER — ANTI-COAG VISIT (OUTPATIENT)
Dept: PHARMACY | Age: 81
End: 2021-08-11

## 2021-08-11 DIAGNOSIS — Z79.01 LONG TERM CURRENT USE OF ANTICOAGULANT THERAPY: Primary | ICD-10-CM

## 2021-08-13 ENCOUNTER — HOSPITAL ENCOUNTER (OUTPATIENT)
Dept: CARDIAC REHAB | Age: 81
Discharge: HOME | End: 2021-08-13

## 2021-08-13 PROCEDURE — 9900000065 HC CARDIAC REHAB PHASE 3 - 1 VISIT

## 2021-08-17 ENCOUNTER — HOSPITAL ENCOUNTER (OUTPATIENT)
Dept: CARDIAC REHAB | Age: 81
Setting detail: THERAPIES SERIES
Discharge: HOME | End: 2021-08-17

## 2021-08-20 ENCOUNTER — HOSPITAL ENCOUNTER (OUTPATIENT)
Dept: CARDIAC REHAB | Age: 81
Setting detail: THERAPIES SERIES
Discharge: HOME | End: 2021-08-20

## 2021-08-24 ENCOUNTER — HOSPITAL ENCOUNTER (OUTPATIENT)
Dept: CARDIAC REHAB | Age: 81
Setting detail: THERAPIES SERIES
Discharge: HOME | End: 2021-08-24

## 2021-08-26 ENCOUNTER — TELEPHONE (OUTPATIENT)
Dept: PHARMACY | Age: 81
End: 2021-08-26

## 2021-08-26 NOTE — TELEPHONE ENCOUNTER
Recent Travel Screening and Travel History documentation:     Travel Screening     Question   Response    In the last month, have you been in contact with someone who was confirmed or suspected to have Coronavirus / COVID-19? No / Unsure    Have you had a COVID-19 viral test in the last 14 days? No    Do you have any of the following new or worsening symptoms? None of these    Have you traveled internationally or domestically in the last month?   No      Travel History   Travel since 07/26/21     No documented travel since 07/26/21         Tim Sierra, PharmD 8/26/2021 8:57 AM

## 2021-08-27 ENCOUNTER — HOSPITAL ENCOUNTER (OUTPATIENT)
Dept: PHARMACY | Age: 81
Setting detail: THERAPIES SERIES
Discharge: HOME | End: 2021-08-27
Payer: MEDICARE

## 2021-08-27 ENCOUNTER — HOSPITAL ENCOUNTER (OUTPATIENT)
Dept: CARDIAC REHAB | Age: 81
Setting detail: THERAPIES SERIES
Discharge: HOME | End: 2021-08-27

## 2021-08-27 VITALS
BODY MASS INDEX: 22.65 KG/M2 | SYSTOLIC BLOOD PRESSURE: 113 MMHG | DIASTOLIC BLOOD PRESSURE: 62 MMHG | WEIGHT: 167 LBS | HEART RATE: 77 BPM

## 2021-08-27 DIAGNOSIS — Z79.01 LONG TERM CURRENT USE OF ANTICOAGULANT THERAPY: Primary | ICD-10-CM

## 2021-08-27 LAB — INR BLD: 3

## 2021-08-27 PROCEDURE — 85610 PROTHROMBIN TIME: CPT

## 2021-08-27 PROCEDURE — 99211 OFF/OP EST MAY X REQ PHY/QHP: CPT

## 2021-08-27 NOTE — PATIENT INSTRUCTIONS
Continue current dose of warfarin as instructed on dosing calendar provided - 8 mg every Monday and 6 mg all other days. Return to clinic in 2 weeks. Continue to monitor urine and stool for signs and symptoms of bleeding. Please notify the clinic of any medication changes.

## 2021-08-27 NOTE — PROGRESS NOTES
[address] [facility]/[place]  Medication Management  ANTICOAGULATION    Referring Provider: Bridgette Vera    GOAL INR: 2-3    TODAY'S INR: 3    WARFARIN Dosage: 8 mg po every Monday; 6 mg po all other days    INR (no units)   Date Value   08/27/2021 3   08/06/2021 1.8   07/09/2021 1.9   06/11/2021 2   05/14/2021 1.9   04/16/2021 2.9   03/19/2021 2.3         Notes:    Fingerstick INR drawn per clinic protocol. Patient states no visible blood in urine and no black tarry stool. Denies any missed doses of warfarin. Denies diarrhea, fever, vomiting. No change in alcohol intake. No change in other maintenance medications or in diet. Will recheck INR in 2 weeks. Patient acknowledges working in consult agreement with pharmacist as referred by his/her physician. For Pharmacy Admin Tracking Only     Total # of Interventions Recommended: 0   Total # of Interventions Accepted: 0   Time Spent (min): [place].  [place], [facility]

## 2021-08-31 ENCOUNTER — HOSPITAL ENCOUNTER (OUTPATIENT)
Dept: CARDIAC REHAB | Age: 81
Setting detail: THERAPIES SERIES
Discharge: HOME | End: 2021-08-31

## 2021-09-03 ENCOUNTER — HOSPITAL ENCOUNTER (OUTPATIENT)
Dept: CARDIAC REHAB | Age: 81
Discharge: HOME | End: 2021-09-03
Payer: MEDICARE

## 2021-09-07 ENCOUNTER — HOSPITAL ENCOUNTER (OUTPATIENT)
Dept: CARDIAC REHAB | Age: 81
Discharge: HOME | End: 2021-09-07
Payer: MEDICARE

## 2021-09-10 ENCOUNTER — HOSPITAL ENCOUNTER (OUTPATIENT)
Dept: CARDIAC REHAB | Age: 81
Discharge: HOME | End: 2021-09-10
Payer: MEDICARE

## 2021-09-10 ENCOUNTER — HOSPITAL ENCOUNTER (OUTPATIENT)
Dept: PHARMACY | Age: 81
Setting detail: THERAPIES SERIES
Discharge: HOME | End: 2021-09-10
Payer: MEDICARE

## 2021-09-10 VITALS
DIASTOLIC BLOOD PRESSURE: 62 MMHG | HEART RATE: 67 BPM | BODY MASS INDEX: 22.92 KG/M2 | WEIGHT: 169 LBS | SYSTOLIC BLOOD PRESSURE: 129 MMHG

## 2021-09-10 DIAGNOSIS — Z79.01 LONG TERM CURRENT USE OF ANTICOAGULANT THERAPY: Primary | ICD-10-CM

## 2021-09-10 LAB — INR BLD: 3.1

## 2021-09-10 PROCEDURE — 85610 PROTHROMBIN TIME: CPT

## 2021-09-10 PROCEDURE — 99211 OFF/OP EST MAY X REQ PHY/QHP: CPT

## 2021-09-10 NOTE — PROGRESS NOTES
[address] [place] [address]/[place]  Medication Management  ANTICOAGULATION    Referring Provider: Yung Barksdale     GOAL INR: 2-3     TODAY'S INR: 3.1     WARFARIN Dosage: Decrease warfarin to 1.5 tablets for 6 mg daily. INR (no units)   Date Value   2021 3   2021 1.8   2021 1.9   2021 2   2021 1.9   2021 2.9   2021 2.3     Medication changes:  None    Notes:    Fingerstick INR drawn per clinic protocol. Patient states no visible blood in urine and no black tarry stool. Denies any missed doses of warfarin. No change in other maintenance medications or in diet. Will recheck INR in 3 weeks. Patient acknowledges working in consult agreement with pharmacist as referred by his/her physician.                   For Pharmacy Admin Tracking Only     Intervention Detail: Adherence Monitorin and Dose Adjustment: 1, reason: Therapy Optimization   Total # of Interventions Recommended: 1   Total # of Interventions Accepted: 1   Time Spent (min): [address], [facility], PharmD

## 2021-09-14 ENCOUNTER — HOSPITAL ENCOUNTER (OUTPATIENT)
Dept: CARDIAC REHAB | Age: 81
Discharge: HOME | End: 2021-09-14
Payer: MEDICARE

## 2021-09-20 ENCOUNTER — HOSPITAL ENCOUNTER (OUTPATIENT)
Dept: PHARMACY | Age: 81
Setting detail: THERAPIES SERIES
Discharge: HOME | End: 2021-09-20
Payer: MEDICARE

## 2021-09-20 DIAGNOSIS — Z79.01 LONG TERM CURRENT USE OF ANTICOAGULANT THERAPY: Primary | ICD-10-CM

## 2021-09-20 LAB — INR BLD: 2.8

## 2021-09-20 PROCEDURE — 85610 PROTHROMBIN TIME: CPT

## 2021-09-20 PROCEDURE — 99212 OFFICE O/P EST SF 10 MIN: CPT

## 2021-09-20 RX ORDER — AMOXICILLIN 500 MG/1
500 CAPSULE ORAL 3 TIMES DAILY
COMMUNITY
Start: 2021-09-16 | End: 2021-10-01 | Stop reason: ALTCHOICE

## 2021-09-20 NOTE — PROGRESS NOTES
[address] [facility]/[place]  Medication Management  ANTICOAGULATION    Referring Provider: Alberto Hollins     GOAL INR: 2-3     TODAY'S INR: 2.8     WARFARIN Dosage: Hold warfarin today then continue warfarin to 1.5 tablets for 6 mg daily if not bleeding. INR (no units)   Date Value   09/10/2021 3.1   2021 3   2021 1.8   2021 1.9   2021 2   2021 1.9   2021 2.9     Medication changes:  Amoxicillin 4 today before procedure then TID for a week    Notes:    Fingerstick INR drawn per clinic protocol. Patient states no visible blood in urine and no black tarry stool. Denies any missed doses of warfarin. No change in other maintenance medications or in diet. Will recheck INR in 2 weeks. Patient acknowledges working in consult agreement with pharmacist as referred by his/her physician. Dr Manny Wasserman called twice due to patient still bleeding after tooth implant. Patient came right to clinic for INR check. I called and spoke to Dr Manny Wasserman ([phone]) himself and he agrees with plan to hold warfarin today then resume previous stable dosage. Patient will call me tomorrow if still bleeding.            For Pharmacy Admin Tracking Only     Intervention Detail: Adherence Monitorin, Dose Adjustment: 1, reason: Therapy Optimization and New Rx: 1, reason: Needs Additional Therapy   Total # of Interventions Recommended: 2   Total # of Interventions Accepted: 2   Time Spent (min): Eryn De Anda, [facility], PharmD

## 2021-09-20 NOTE — PATIENT INSTRUCTIONS
Continue to monitor urine and stool. Continue to monitor for signs of bleeding. Return to clinic in 4 weeks. Hold warfarin today then continue warfarin to 1.5 tablets for 6 mg daily if not bleeding. Call me tomorrow if still bleeding.

## 2021-09-24 ENCOUNTER — HOSPITAL ENCOUNTER (OUTPATIENT)
Dept: CARDIAC REHAB | Age: 81
Discharge: HOME | End: 2021-09-24
Payer: MEDICARE

## 2021-09-28 ENCOUNTER — HOSPITAL ENCOUNTER (OUTPATIENT)
Dept: CARDIAC REHAB | Age: 81
Discharge: HOME | End: 2021-09-28
Payer: MEDICARE

## 2021-09-30 ENCOUNTER — TELEPHONE (OUTPATIENT)
Dept: PHARMACY | Age: 81
End: 2021-09-30

## 2021-09-30 NOTE — TELEPHONE ENCOUNTER
Recent Travel Screening and Travel History documentation:     Travel Screening     Question   Response    In the last month, have you been in contact with someone who was confirmed or suspected to have Coronavirus / COVID-19? No / Unsure    Have you had a COVID-19 viral test in the last 14 days? No    Do you have any of the following new or worsening symptoms? None of these    Have you traveled internationally or domestically in the last month?   No      Travel History   Travel since 08/30/21     No documented travel since 08/30/21         Kieran KauffmanD 9/30/2021 9:15 AM

## 2021-10-01 ENCOUNTER — HOSPITAL ENCOUNTER (OUTPATIENT)
Dept: CARDIAC REHAB | Age: 81
Discharge: HOME | End: 2021-10-01

## 2021-10-01 ENCOUNTER — HOSPITAL ENCOUNTER (OUTPATIENT)
Dept: PHARMACY | Age: 81
Setting detail: THERAPIES SERIES
Discharge: HOME | End: 2021-10-01
Payer: MEDICARE

## 2021-10-01 VITALS
DIASTOLIC BLOOD PRESSURE: 74 MMHG | BODY MASS INDEX: 22.78 KG/M2 | HEART RATE: 76 BPM | WEIGHT: 168 LBS | SYSTOLIC BLOOD PRESSURE: 109 MMHG

## 2021-10-01 DIAGNOSIS — Z79.01 LONG TERM CURRENT USE OF ANTICOAGULANT THERAPY: Primary | ICD-10-CM

## 2021-10-01 LAB — INR BLD: 3.4

## 2021-10-01 PROCEDURE — 85610 PROTHROMBIN TIME: CPT

## 2021-10-01 PROCEDURE — 99212 OFFICE O/P EST SF 10 MIN: CPT

## 2021-10-01 NOTE — PATIENT INSTRUCTIONS
Continue to monitor urine and stool. Continue to monitor for signs of bleeding. Return to clinic in 3 weeks. Hold warfarin today then decrease warfarin to whole 4 mg tablet on Thursdays and 1.5 tablets for 6 mg all other days.

## 2021-10-01 NOTE — PROGRESS NOTES
[facility] [address] [place] [address]/[place]  Medication Management  ANTICOAGULATION    Referring Provider: Alberto Hollins     GOAL INR: 2-3     TODAY'S INR: 3.4     WARFARIN Dosage: Hold warfarin today then decrease warfarin to whole 4 mg tablet on  and 1.5 tablets for 6 mg all other days. INR (no units)   Date Value   2021 2.8   09/10/2021 3.1   2021 3   2021 1.8   2021 1.9   2021 2   2021 1.9     Medication changes:  Finished Amoxil Wednesday. Notes:    Fingerstick INR drawn per clinic protocol. Patient states no visible blood in urine and no black tarry stool. Denies any missed doses of warfarin. No change in other maintenance medications or in diet. Will recheck INR in 3 weeks. Patient acknowledges working in consult agreement with pharmacist as referred by his/her physician.                   For Pharmacy Admin Tracking Only     Intervention Detail: Adherence Monitorin and Dose Adjustment: 2, reason: Therapy Optimization   Total # of Interventions Recommended: 2   Total # of Interventions Accepted: 2   Time Spent (min): 601 [facility], PharmD

## 2021-10-05 ENCOUNTER — HOSPITAL ENCOUNTER (OUTPATIENT)
Dept: CARDIAC REHAB | Age: 81
Discharge: HOME | End: 2021-10-05

## 2021-10-08 ENCOUNTER — HOSPITAL ENCOUNTER (OUTPATIENT)
Dept: CARDIAC REHAB | Age: 81
Discharge: HOME | End: 2021-10-08

## 2021-10-25 ENCOUNTER — TELEPHONE (OUTPATIENT)
Dept: PHARMACY | Age: 81
End: 2021-10-25

## 2021-10-25 NOTE — TELEPHONE ENCOUNTER
Recent Travel Screening and Travel History documentation:     Travel Screening     Question   Response    In the last month, have you been in contact with someone who was confirmed or suspected to have Coronavirus / COVID-19? No / Unsure    Have you had a COVID-19 viral test in the last 14 days? No    Do you have any of the following new or worsening symptoms? None of these    Have you traveled internationally or domestically in the last month?   No      Travel History   Travel since 09/25/21     No documented travel since 09/25/21

## 2021-10-26 ENCOUNTER — HOSPITAL ENCOUNTER (OUTPATIENT)
Dept: PHARMACY | Age: 81
Setting detail: THERAPIES SERIES
Discharge: HOME | End: 2021-10-26
Payer: MEDICARE

## 2021-10-26 ENCOUNTER — HOSPITAL ENCOUNTER (OUTPATIENT)
Dept: CARDIAC REHAB | Age: 81
Discharge: HOME | End: 2021-10-26

## 2021-10-26 VITALS
HEART RATE: 81 BPM | SYSTOLIC BLOOD PRESSURE: 123 MMHG | DIASTOLIC BLOOD PRESSURE: 78 MMHG | BODY MASS INDEX: 22.78 KG/M2 | WEIGHT: 168 LBS

## 2021-10-26 DIAGNOSIS — Z79.01 LONG TERM CURRENT USE OF ANTICOAGULANT THERAPY: Primary | ICD-10-CM

## 2021-10-26 LAB — INR BLD: 2

## 2021-10-26 PROCEDURE — 99211 OFF/OP EST MAY X REQ PHY/QHP: CPT

## 2021-10-26 PROCEDURE — 85610 PROTHROMBIN TIME: CPT

## 2021-10-26 RX ORDER — METOPROLOL SUCCINATE 50 MG/1
50 TABLET, EXTENDED RELEASE ORAL DAILY
COMMUNITY
Start: 2021-10-15 | End: 2022-09-06

## 2021-10-26 NOTE — PATIENT INSTRUCTIONS
Continue to monitor urine and stool. Continue to monitor for signs of bleeding. Return to clinic in 5 weeks. Continue warfarin to whole 4 mg tablet on Thursdays and 1.5 tablets for 6 mg all other days.

## 2021-10-26 NOTE — PATIENT INSTRUCTIONS
Continue to monitor urine and stool. Continue to monitor for signs of bleeding. Return to clinic in 3 weeks. Decrease warfarin to 1.5 tablets for 6 mg daily.
n/a

## 2021-10-26 NOTE — PROGRESS NOTES
[facility] [address] [place] [address]/[place]  Medication Management  ANTICOAGULATION    Referring Provider: Alberto Hollins     GOAL INR: 2-3     TODAY'S INR: 2.0     WARFARIN Dosage: Continue warfarin to whole 4 mg tablet on  and 1.5 tablets for 6 mg all other days. INR (no units)   Date Value   10/01/2021 3.4   2021 2.8   09/10/2021 3.1   2021 3   2021 1.8   2021 1.9   2021 2     Medication changes:  none    Notes:    Fingerstick INR drawn per clinic protocol. Patient states no visible blood in urine and no black tarry stool. Denies any missed doses of warfarin. No change in other maintenance medications or in diet. Will recheck INR in 5 weeks. Patient acknowledges working in consult agreement with pharmacist as referred by his/her physician.                   For Pharmacy Admin Tracking Only     Intervention Detail: Adherence Monitorin   Total # of Interventions Recommended: 0   Total # of Interventions Accepted: 0   Time Spent (min): 601 [facility], [facility], PharmD

## 2021-10-29 ENCOUNTER — HOSPITAL ENCOUNTER (OUTPATIENT)
Dept: CARDIAC REHAB | Age: 81
Discharge: HOME | End: 2021-10-29

## 2021-11-02 ENCOUNTER — HOSPITAL ENCOUNTER (OUTPATIENT)
Dept: CARDIAC REHAB | Age: 81
Discharge: HOME | End: 2021-11-02

## 2021-11-09 ENCOUNTER — HOSPITAL ENCOUNTER (OUTPATIENT)
Dept: CARDIAC REHAB | Age: 81
Discharge: HOME | End: 2021-11-09

## 2021-11-15 ENCOUNTER — HOSPITAL ENCOUNTER (OUTPATIENT)
Dept: PHARMACY | Age: 81
Setting detail: THERAPIES SERIES
Discharge: HOME | End: 2021-11-15
Payer: MEDICARE

## 2021-11-15 DIAGNOSIS — Z79.01 LONG TERM CURRENT USE OF ANTICOAGULANT THERAPY: Primary | ICD-10-CM

## 2021-11-15 LAB — INR BLD: 2.6

## 2021-11-15 PROCEDURE — 99211 OFF/OP EST MAY X REQ PHY/QHP: CPT

## 2021-11-15 PROCEDURE — 85610 PROTHROMBIN TIME: CPT

## 2021-11-15 NOTE — PROGRESS NOTES
[facility] [address] [facility]/[place]  Medication Management  ANTICOAGULATION    Referring Provider: Alberto Hollins     GOAL INR: 2-3     TODAY'S INR: 2.6     WARFARIN Dosage: Continue warfarin to whole 4 mg tablet on  and 1.5 tablets for 6 mg all other days. INR (no units)   Date Value   10/26/2021 2   10/01/2021 3.4   2021 2.8   09/10/2021 3.1   2021 3   2021 1.8   2021 1.9     Medication changes:  Changing from Mexitil to Amiodarone tomorrow    Notes:    Fingerstick INR drawn per clinic protocol. Patient states no visible blood in urine and no black tarry stool. Denies any missed doses of warfarin. No change in other maintenance medications or in diet. Will recheck INR in 2 weeks. Patient acknowledges working in consult agreement with pharmacist as referred by his/her physician. Patient having cardioversion tomorrow with Dr Levi Cline. He will be admitted to [facility] for a couple days. Changing from Mexitil to Amiodarone tomorrow  Which can increase INR over a 3 month period of time.        For Pharmacy Admin Tracking Only     Intervention Detail: Adherence Monitorin and New Rx: 2, reason: Needs Additional Therapy   Total # of Interventions Recommended: 2   Total # of Interventions Accepted: 2   Time Spent (min): 601 [facility], [facility], PharmD

## 2021-11-19 ENCOUNTER — TELEPHONE (OUTPATIENT)
Dept: PHARMACY | Age: 81
End: 2021-11-19

## 2021-11-19 NOTE — TELEPHONE ENCOUNTER
Recent Travel Screening and Travel History documentation:     Travel Screening     Question   Response    In the last month, have you been in contact with someone who was confirmed or suspected to have Coronavirus / COVID-19? No / Unsure    Have you had a COVID-19 viral test in the last 14 days? No    Do you have any of the following new or worsening symptoms? None of these    Have you traveled internationally or domestically in the last month?   No      Travel History   Travel since 10/19/21    No documented travel since 10/19/21        Jeanne Dallas, PharmD 11/19/2021 1:29 PM

## 2021-11-22 ENCOUNTER — HOSPITAL ENCOUNTER (OUTPATIENT)
Dept: PHARMACY | Age: 81
Setting detail: THERAPIES SERIES
Discharge: HOME | End: 2021-11-22
Payer: MEDICARE

## 2021-11-22 VITALS
HEART RATE: 73 BPM | BODY MASS INDEX: 23.19 KG/M2 | DIASTOLIC BLOOD PRESSURE: 68 MMHG | SYSTOLIC BLOOD PRESSURE: 97 MMHG | WEIGHT: 171 LBS

## 2021-11-22 DIAGNOSIS — Z79.01 LONG TERM CURRENT USE OF ANTICOAGULANT THERAPY: Primary | ICD-10-CM

## 2021-11-22 LAB — INR BLD: 3.4

## 2021-11-22 PROCEDURE — 85610 PROTHROMBIN TIME: CPT

## 2021-11-22 PROCEDURE — 99212 OFFICE O/P EST SF 10 MIN: CPT

## 2021-11-22 RX ORDER — AMIODARONE HYDROCHLORIDE 200 MG/1
200 TABLET ORAL DAILY
COMMUNITY
Start: 2021-11-19

## 2021-11-22 NOTE — PATIENT INSTRUCTIONS
Continue to monitor urine and stool. Continue to monitor for signs of bleeding. Return to clinic in 1 week. Hold warfarin today then continue warfarin to whole 4 mg tablet on Thursdays and 1.5 tablets for 6 mg all other days.

## 2021-11-22 NOTE — PROGRESS NOTES
[facility] [address] [facility]/[place]  Medication Management  ANTICOAGULATION    Referring Provider: Alberto Hollins     GOAL INR: 2-3     TODAY'S INR: 3.4     WARFARIN Dosage: Hold warfarin today then continue warfarin to whole 4 mg tablet on  and 1.5 tablets for 6 mg all other days. INR (no units)   Date Value   11/15/2021 2.6   10/26/2021 2   10/01/2021 3.4   2021 2.8   09/10/2021 3.1   2021 3   2021 1.8       Medication changes:  Changed from Mexitil to Amiodarone. Which can increase INR over a 3 month period of time. Notes:    Fingerstick INR drawn per clinic protocol. Patient states no visible blood in urine and no black tarry stool. Denies any missed doses of warfarin. No change in other maintenance medications or in diet. Will recheck INR in 1 week. Patient acknowledges working in consult agreement with pharmacist as referred by his/her physician. Patient did not have cardioversion with Dr Zohra Rodriguez due to INR of 2.0     He was admitted to [facility] for a couple days. Changed from Mexitil to Amiodarone. Which can increase INR over a 3 month period of time. Cardioversion rescheduled to 21. Dr Zohra Rodriguez wants INR 2.6 or higher. Patient accidently took warfarin twice yesterday. Patient did not sleep well last night and is tired and wife brought him to appointment. Marcelina Baxter is not as with it as he usually is.     For Pharmacy Admin Tracking Only     Intervention Detail: Adherence Monitorin, Dose Adjustment: 1, reason: Therapy Optimization and New Rx: 2, reason: Needs Additional Therapy   Total # of Interventions Recommended: 3   Total # of Interventions Accepted: 3   Time Spent (min): 601 [facility], [facility], PharmD

## 2021-11-24 ENCOUNTER — TELEPHONE (OUTPATIENT)
Dept: PHARMACY | Age: 81
End: 2021-11-24

## 2021-11-24 NOTE — TELEPHONE ENCOUNTER
Recent Travel Screening and Travel History documentation:     Travel Screening     Question   Response    In the last month, have you been in contact with someone who was confirmed or suspected to have Coronavirus / COVID-19? No / Unsure    Have you had a COVID-19 viral test in the last 14 days? No    Do you have any of the following new or worsening symptoms? None of these    Have you traveled internationally or domestically in the last month?   No      Travel History   Travel since 10/24/21    No documented travel since 10/24/21        Randall Adams, PharmD 11/24/2021 10:58 AM

## 2021-11-29 ENCOUNTER — HOSPITAL ENCOUNTER (OUTPATIENT)
Dept: PHARMACY | Age: 81
Setting detail: THERAPIES SERIES
Discharge: HOME | End: 2021-11-29
Payer: MEDICARE

## 2021-11-29 VITALS
WEIGHT: 173 LBS | DIASTOLIC BLOOD PRESSURE: 76 MMHG | SYSTOLIC BLOOD PRESSURE: 111 MMHG | BODY MASS INDEX: 23.46 KG/M2 | HEART RATE: 72 BPM

## 2021-11-29 DIAGNOSIS — Z79.01 LONG TERM CURRENT USE OF ANTICOAGULANT THERAPY: Primary | ICD-10-CM

## 2021-11-29 LAB — INR BLD: 2.2

## 2021-11-29 PROCEDURE — 85610 PROTHROMBIN TIME: CPT

## 2021-11-29 PROCEDURE — 99211 OFF/OP EST MAY X REQ PHY/QHP: CPT

## 2021-11-29 NOTE — PROGRESS NOTES
[address]/[place]  Medication Management  ANTICOAGULATION    Referring Provider: Alberto Hollins     GOAL INR: 2-3     TODAY'S INR: 2.2     WARFARIN Dosage: Take warfarin 2 tablets for 8 mg today then continue warfarin whole 4 mg tablet on  and 1.5 tablets for 6 mg all other days. INR (no units)   Date Value   2021 3.4   11/15/2021 2.6   10/26/2021 2   10/01/2021 3.4   2021 2.8   09/10/2021 3.1   2021 3       Medication changes:  Changed from Mexitil to Amiodarone. Which can increase INR over a 3 month period of time.      Notes:    Fingerstick INR drawn per clinic protocol. Patient states no visible blood in urine and no black tarry stool. Denies any missed doses of warfarin. No change in other maintenance medications or in diet. Will recheck INR in 2 days. Patient acknowledges working in consult agreement with pharmacist as referred by his/her physician. Patient did not have cardioversion with Dr Neo Valdovinos due to INR of 2.0     He was admitted to [facility] for a couple days. Yasmin Shannon from [facility] to [place] [id number] can increase INR over a 3 month period of time.    Cardioversion rescheduled to 21. Dr Neo Valdovinos wants INR 2.6 or higher. INR recheck on 21 to be sure cardioversion will be able to happen. Tang Cardenas is back to himself today and able to come to [facility] by himself.              For Pharmacy Admin Tracking Only     Intervention Detail: Adherence Monitorin, Dose Adjustment: 1, reason: Therapy Optimization and New Rx: 1, reason: Needs Additional Therapy   Total # of Interventions Recommended: 2   Total # of Interventions Accepted: 2   Time Spent (min): 601 [facility], [facility], PharmD

## 2021-11-29 NOTE — PATIENT INSTRUCTIONS
Continue to monitor urine and stool. Continue to monitor for signs of bleeding. Return to clinic in 2 days. Take warfarin 2 tablets for 8 mg today then continue warfarin whole 4 mg tablet on Thursdays and 1.5 tablets for 6 mg all other days.

## 2021-11-30 ENCOUNTER — TELEPHONE (OUTPATIENT)
Dept: PHARMACY | Age: 81
End: 2021-11-30

## 2021-11-30 NOTE — TELEPHONE ENCOUNTER
Recent Travel Screening and Travel History documentation:     Travel Screening     Question   Response    In the last month, have you been in contact with someone who was confirmed or suspected to have Coronavirus / COVID-19? Unable to assess    Have you had a COVID-19 viral test in the last 14 days? Unable to assess    Do you have any of the following new or worsening symptoms? Unable to assess    Have you traveled internationally or domestically in the last month?   Unable to assess      Travel History   Travel since 10/30/21    No documented travel since 10/30/21        Brandy Aaron, PharmD 11/30/2021 9:40 AM

## 2021-12-01 ENCOUNTER — HOSPITAL ENCOUNTER (OUTPATIENT)
Dept: PHARMACY | Age: 81
Setting detail: THERAPIES SERIES
Discharge: HOME | End: 2021-12-01
Payer: MEDICARE

## 2021-12-01 VITALS
SYSTOLIC BLOOD PRESSURE: 118 MMHG | WEIGHT: 172 LBS | DIASTOLIC BLOOD PRESSURE: 86 MMHG | HEART RATE: 86 BPM | BODY MASS INDEX: 23.33 KG/M2

## 2021-12-01 DIAGNOSIS — Z79.01 LONG TERM CURRENT USE OF ANTICOAGULANT THERAPY: Primary | ICD-10-CM

## 2021-12-01 LAB — INR BLD: 3.4

## 2021-12-01 PROCEDURE — 85610 PROTHROMBIN TIME: CPT

## 2021-12-01 PROCEDURE — 99212 OFFICE O/P EST SF 10 MIN: CPT

## 2021-12-01 NOTE — PATIENT INSTRUCTIONS
Continue to monitor urine and stool. Continue to monitor for signs of bleeding. Return to clinic in 1 week. Continue warfarin whole 4 mg tablet on Thursdays and 1.5 tablets for 6 mg all other days.

## 2021-12-01 NOTE — PROGRESS NOTES
[facility] [address] [place] [address]/[place]  Medication Management  ANTICOAGULATION    Referring Provider: Alberto Hollins     GOAL INR: 2-3     TODAY'S INR: 3.4     WARFARIN Dosage: Continue warfarin whole 4 mg tablet on  and 1.5 tablets for 6 mg all other days. INR (no units)   Date Value   2021 2.2   2021 3.4   11/15/2021 2.6   10/26/2021 2   10/01/2021 3.4   2021 2.8   09/10/2021 3.1     Medication changes:  Changed from Mexitil to Amiodarone. Which can increase INR over a 3 month period of time.      Notes:    Fingerstick INR drawn per clinic protocol. Patient states no visible blood in urine and no black tarry stool. Denies any missed doses of warfarin. No change in other maintenance medications or in diet. Will recheck INR in 1 week. Patient acknowledges working in consult agreement with pharmacist as referred by his/her physician. Patient did not have cardioversion with Dr Charles Blankenship due to INR of 2.0     He was admitted to [facility] for a couple days. Elsie Neither Mexitil to Amiodarone. Which can increase INR over a 3 month period of time.    Cardioversion rescheduled to 21.  Dr Charles Blankenship wants INR 2.6 or higher. INR recheck today to be sure cardioversion will be able to happen. I called Dr Luis A Gasca office and verified that Isadora Simmons could get cardioversion done with supra-therapeutic INR. Juan Jose is back to himself today and [facility] to [facility].                         For Pharmacy Admin Tracking Only     Intervention Detail: Adherence Monitorin, Dose Adjustment: 0, reason: Therapy Optimization and New Rx: 1, reason: Needs Additional Therapy   Total # of Interventions Recommended: 1   Total # of Interventions Accepted: 1   Time Spent (min): 601 [facility], [facility], PharmD

## 2021-12-02 ENCOUNTER — ANTI-COAG VISIT (OUTPATIENT)
Dept: PHARMACY | Age: 81
End: 2021-12-02

## 2021-12-02 DIAGNOSIS — Z79.01 LONG TERM CURRENT USE OF ANTICOAGULANT THERAPY: Primary | ICD-10-CM

## 2021-12-02 NOTE — PROGRESS NOTES
Patient went to [address] again today (21) for cardioversion and it was cancelled AGAIN this time due to INR too high with INR reading of 4.7 at [address] today. Yesterday at our clinic INR was 3.4. Which I called Dr David Roman office and confirmed was good for cardioversion. Patient will start taking warfarin in the evening. Patient already took warfarin today. Patient will hold warfarin tomorrow 12-3-21 then take warfarin 6 mg in the evening on 21 and evening on 21 and return to clinic on Monday afternoon. Cardioversion is rescheduled for 21. Patient did start Amiodarone which can increase INR but takes about 3 months to see the full effect. INR needs to be between 2.5-3.5 for cardioversion. INR goal remains 2-3.     For Pharmacy Admin Tracking Only     Intervention Detail: Adherence Monitorin and Dose Adjustment: 1, reason: Therapy Optimization   Total # of Interventions Recommended: 1   Total # of Interventions Accepted: 1   Time Spent (min): 15

## 2021-12-06 ENCOUNTER — HOSPITAL ENCOUNTER (OUTPATIENT)
Dept: PHARMACY | Age: 81
Setting detail: THERAPIES SERIES
Discharge: HOME | End: 2021-12-06
Payer: MEDICARE

## 2021-12-06 VITALS
SYSTOLIC BLOOD PRESSURE: 101 MMHG | HEART RATE: 73 BPM | DIASTOLIC BLOOD PRESSURE: 71 MMHG | BODY MASS INDEX: 23.12 KG/M2 | WEIGHT: 170.5 LBS

## 2021-12-06 DIAGNOSIS — Z79.01 LONG TERM CURRENT USE OF ANTICOAGULANT THERAPY: ICD-10-CM

## 2021-12-06 DIAGNOSIS — I48.91 ATRIAL FIBRILLATION, UNSPECIFIED TYPE (HCC): Primary | ICD-10-CM

## 2021-12-06 LAB — INR BLD: 3.5

## 2021-12-06 PROCEDURE — 85610 PROTHROMBIN TIME: CPT | Performed by: FAMILY MEDICINE

## 2021-12-06 PROCEDURE — 99211 OFF/OP EST MAY X REQ PHY/QHP: CPT | Performed by: FAMILY MEDICINE

## 2021-12-06 NOTE — PROGRESS NOTES
[address]/[place]  Medication Management  ANTICOAGULATION    Referring Provider: Dr Shaw Quiñones INR: 2.0-3.0 (goal range for cardioversion is 2.5-3.5)    TODAY'S INR: 3.5    WARFARIN Dosage: 2mg today    INR (no units)   Date Value   12/06/2021 3.5   12/01/2021 3.4   11/29/2021 2.2   11/22/2021 3.4   11/15/2021 2.6   10/26/2021 2   10/01/2021 3.4       Medication changes:  No changes  Notes:    Fingerstick INR drawn per clinic protocol. Patient states no visible blood in urine and no black tarry stool. Denies any missed doses of warfarin. No change in other maintenance medications or in diet. Will recheck INR tomorrow. Patient held warfarin on Friday after elevated INR prior to cardioversion at [facility]. Patient took 6mg Sat/Sun. Patient will take 2mg warfarin today and requested INR check tomorrow. Patient acknowledges working in consult agreement with pharmacist as referred by his/her physician.                   For Pharmacy Admin Tracking Only     Intervention Detail: Dose Adjustment: 1, reason: Therapy Optimization   Total # of Interventions Recommended: 2   Total # of Interventions Accepted: 2   Time Spent (min): 30    NICHOLAS Ackerman.Ph., 12/6/2021,3:48 PM

## 2021-12-06 NOTE — PATIENT INSTRUCTIONS
Please take only 2mg (1/2 tablet) today  then return to coumadin clinic tomorrow. Continue to monitor for signs of bleeding.

## 2021-12-07 ENCOUNTER — HOSPITAL ENCOUNTER (OUTPATIENT)
Dept: PHARMACY | Age: 81
Setting detail: THERAPIES SERIES
Discharge: HOME | End: 2021-12-07
Payer: MEDICARE

## 2021-12-07 DIAGNOSIS — Z79.01 LONG TERM CURRENT USE OF ANTICOAGULANT THERAPY: ICD-10-CM

## 2021-12-07 DIAGNOSIS — I48.91 ATRIAL FIBRILLATION, UNSPECIFIED TYPE (HCC): Primary | ICD-10-CM

## 2021-12-07 LAB — INR BLD: 3.9

## 2021-12-07 PROCEDURE — 85610 PROTHROMBIN TIME: CPT | Performed by: FAMILY MEDICINE

## 2021-12-07 PROCEDURE — 99211 OFF/OP EST MAY X REQ PHY/QHP: CPT | Performed by: FAMILY MEDICINE

## 2021-12-07 NOTE — PROGRESS NOTES
[address]/[place]  Medication Management  ANTICOAGULATION    Referring Provider: Dr Bladimir Keeys INR: 2.0-3.0 (cardioversion scheduled for 12/9/21 and goal range is 2.5-3.5)    TODAY'S INR: 3.9    WARFARIN Dosage: hold dose today     INR (no units)   Date Value   12/07/2021 3.9   12/06/2021 3.5   12/01/2021 3.4   11/29/2021 2.2   11/22/2021 3.4   11/15/2021 2.6   10/26/2021 2       Medication changes:  No changes  AMIODARONE STARTED 11/16/21  Notes:    Fingerstick INR drawn per clinic protocol. Patient states no visible blood in urine and no black tarry stool. Denies any missed doses of warfarin. No change in other maintenance medications or in diet. Will recheck INR in 1 day. Patient is having cardioversion on 12/9 and requires INR between 2.5-3.5. Patient took only 2mg warfarin last evening (original dose is 6mg) and INR continued to increase. Patient will hold warfarin today and recheck INR tomorrow. patient is having pre-procedure COVID test at [facility] following this appointment. Patient acknowledges working in consult agreement with pharmacist as referred by his/her physician.                   For Pharmacy Admin Tracking Only     Intervention Detail: Dose Adjustment: 1, reason: Therapy Optimization   Total # of Interventions Recommended: 2   Total # of Interventions Accepted: 2   Time Spent (min): 1901 NICHOLAS Srinivasan.Ph., 12/7/2021,12:57 PM

## 2021-12-08 ENCOUNTER — HOSPITAL ENCOUNTER (OUTPATIENT)
Dept: PHARMACY | Age: 81
Setting detail: THERAPIES SERIES
Discharge: HOME | End: 2021-12-08
Payer: MEDICARE

## 2021-12-08 VITALS — BODY MASS INDEX: 22.65 KG/M2 | WEIGHT: 167 LBS

## 2021-12-08 DIAGNOSIS — Z79.01 LONG TERM CURRENT USE OF ANTICOAGULANT THERAPY: Primary | ICD-10-CM

## 2021-12-08 LAB — INR BLD: 2.7

## 2021-12-08 PROCEDURE — 85610 PROTHROMBIN TIME: CPT

## 2021-12-08 PROCEDURE — 99212 OFFICE O/P EST SF 10 MIN: CPT

## 2021-12-08 NOTE — PROGRESS NOTES
[address]/[place]  Medication Management  ANTICOAGULATION    Referring Provider: Scott Mittal    GOAL INR: 2-3 (cardioversion scheduled for 12/9/21 and goal range is 2.5-3.5)    TODAY'S INR: 2.7    WARFARIN Dosage: Patient will take warfarin 6 mg po today for 1 dose. Patient will decrease warfarin to 4 mg po every MWF and 6 mg po all other days post-cardioversion    INR (no units)   Date Value   12/08/2021 2.7   12/07/2021 3.9   12/06/2021 3.5   12/01/2021 3.4   11/29/2021 2.2   11/22/2021 3.4   11/15/2021 2.6       Medication changes:  No changes  AMIODARONE STARTED 11/16/21    Notes:    Fingerstick INR drawn per clinic protocol. Patient states no visible blood in urine and no black tarry stool. Denies any missed doses of warfarin. No change in other maintenance medications or in diet. Patient is having a cardioversion tomorrow (12/9/21) at [facility] and requires INR 2.5-3.5. Patient will take warfarin 6 mg po today. Patient will decrease weekly warfarin dose as above - post-cardioversion. Will recheck INR in 1 week. Patient acknowledges working in consult agreement with pharmacist as referred by his/her physician. For Pharmacy Admin Tracking Only     Intervention Detail: Dose Adjustment: 2, reason: Therapy Optimization   Total # of Interventions Recommended: 2   Total # of Interventions Accepted: 2   Time Spent (min): [address]  Vicky Benito, [facility]

## 2021-12-08 NOTE — PATIENT INSTRUCTIONS
Please take warfarin 6 mg today. Decrease current dose of warfarin as instructed on dosing calendar provided - 4 mg every Monday, Wednesday and Friday and 6 mg all other days. Return to clinic in 1 week. Continue to monitor urine and stool for signs and symptoms of bleeding.

## 2021-12-10 ENCOUNTER — APPOINTMENT (OUTPATIENT)
Dept: PHARMACY | Age: 81
End: 2021-12-10
Payer: MEDICARE

## 2021-12-16 ENCOUNTER — APPOINTMENT (OUTPATIENT)
Dept: PHARMACY | Age: 81
End: 2021-12-16
Payer: MEDICARE

## 2021-12-20 ENCOUNTER — HOSPITAL ENCOUNTER (OUTPATIENT)
Dept: PHARMACY | Age: 81
Setting detail: THERAPIES SERIES
Discharge: HOME | End: 2021-12-20
Payer: MEDICARE

## 2021-12-20 DIAGNOSIS — Z79.01 LONG TERM CURRENT USE OF ANTICOAGULANT THERAPY: Primary | ICD-10-CM

## 2021-12-20 LAB — INR BLD: 5.8

## 2021-12-20 PROCEDURE — 99212 OFFICE O/P EST SF 10 MIN: CPT

## 2021-12-20 PROCEDURE — 85610 PROTHROMBIN TIME: CPT

## 2021-12-20 NOTE — PATIENT INSTRUCTIONS
Continue to monitor urine and stool. Continue to monitor for signs of bleeding. Return to clinic in 3 days. Hold warfarin today and tomorrow then take half tablet for 2 mg on Wed. Return to clinic Thursday.

## 2021-12-20 NOTE — PROGRESS NOTES
[facility] [address] [facility]/[place]  Medication Management  ANTICOAGULATION    Referring Provider: Lazarus Carina     GOAL INR: 2-3      TODAY'S INR: 5.8     WARFARIN Dosage: Patient will hold warfarin today and tomorrow then take half tablet for 2 mg on Wed. Return to clinic Thursday. I have decreased the warfarin dosage going forward to 1.5 tablets for 6 mg on MF and whole 4 mg tablet all other days. INR (no units)   Date Value   2021 5.8   2021 2.7   2021 3.9   2021 3.5   2021 3.4   2021 2.2   2021 3.4     Medication changes:  Amiodarone started a month ago which can increase INR over a 3 month period of time. Notes:    Fingerstick INR drawn per clinic protocol. Patient states no visible blood in urine and no black tarry stool. Denies any missed doses of warfarin. No change in other maintenance medications or in diet. Will recheck INR in 3 days. Patient acknowledges working in consult agreement with pharmacist as referred by his/her physician. Amiodarone started a month ago which can increase INR over a 3 month period of time. Patient will hold warfarin today and tomorrow then take half tablet for 2 mg on Wed. Return to clinic Thursday. I have decreased the warfarin dosage going forward to 1.5 tablets for 6 mg on MF and whole 4 mg tablet all other days. Patient had diarrhea in his pants so he did not sit down or get BP. Patient said he has not had any diarrhea until today. Patient also had his pants unzipped. Patient is normally very put together. His mind may be slipping.                 For Pharmacy Admin Tracking Only     Intervention Detail: Adherence Monitorin and Dose Adjustment: 4, reason: Therapy Optimization   Total # of Interventions Recommended: 4   Total # of Interventions Accepted: 4   Time Spent (min): 601 [facility], [facility], PharmD

## 2021-12-23 ENCOUNTER — HOSPITAL ENCOUNTER (OUTPATIENT)
Dept: PHARMACY | Age: 81
Setting detail: THERAPIES SERIES
Discharge: HOME | End: 2021-12-23
Payer: MEDICARE

## 2021-12-23 VITALS
BODY MASS INDEX: 23.33 KG/M2 | DIASTOLIC BLOOD PRESSURE: 67 MMHG | WEIGHT: 172 LBS | SYSTOLIC BLOOD PRESSURE: 102 MMHG | HEART RATE: 69 BPM

## 2021-12-23 DIAGNOSIS — Z79.01 LONG TERM CURRENT USE OF ANTICOAGULANT THERAPY: Primary | ICD-10-CM

## 2021-12-23 LAB — INR BLD: 2.5

## 2021-12-23 PROCEDURE — 85610 PROTHROMBIN TIME: CPT

## 2021-12-23 PROCEDURE — 99212 OFFICE O/P EST SF 10 MIN: CPT

## 2021-12-23 NOTE — PROGRESS NOTES
[facility]  Medication Management  ANTICOAGULATION    Referring Provider: Alberto Hollins     GOAL INR: 2-3      TODAY'S INR: 2.5     WARFARIN Dosage:  Decrease warfarin to 1.5 tablets for 6 mg on  and whole 4 mg tablet all other days. INR (no units)   Date Value   2021 5.8   2021 2.7   2021 3.9   2021 3.5   2021 3.4   2021 2.2   2021 3.4     Medication changes:  Amiodarone started in November    Notes:    Fingerstick INR drawn per clinic protocol. Patient states no visible blood in urine and no black tarry stool. Denies any missed doses of warfarin. No change in other maintenance medications or in diet. Will recheck INR in 1 week. Patient acknowledges working in consult agreement with pharmacist as referred by his/her physician.                   For Pharmacy Admin Tracking Only     Intervention Detail: Adherence Monitorin and Dose Adjustment: 2, reason: Therapy Optimization   Total # of Interventions Recommended: 2   Total # of Interventions Accepted: 2   Time Spent (min):  Zeke Grant [facility], PharmD

## 2021-12-23 NOTE — PATIENT INSTRUCTIONS
Continue to monitor urine and stool. Continue to monitor for signs of bleeding. Return to clinic in 1 week. Decrease warfarin to 1.5 tablets for 6 mg on Mondays and whole 4 mg tablet all other days.

## 2021-12-28 ENCOUNTER — HOSPITAL ENCOUNTER (OUTPATIENT)
Dept: PHARMACY | Age: 81
Setting detail: THERAPIES SERIES
Discharge: HOME | End: 2021-12-28
Payer: MEDICARE

## 2021-12-28 VITALS
HEART RATE: 76 BPM | WEIGHT: 170.2 LBS | DIASTOLIC BLOOD PRESSURE: 65 MMHG | SYSTOLIC BLOOD PRESSURE: 102 MMHG | BODY MASS INDEX: 23.08 KG/M2

## 2021-12-28 DIAGNOSIS — I48.91 ATRIAL FIBRILLATION, UNSPECIFIED TYPE (HCC): Primary | ICD-10-CM

## 2021-12-28 DIAGNOSIS — Z79.01 LONG TERM CURRENT USE OF ANTICOAGULANT THERAPY: ICD-10-CM

## 2021-12-28 LAB — INR BLD: 2.1

## 2021-12-28 PROCEDURE — 99211 OFF/OP EST MAY X REQ PHY/QHP: CPT | Performed by: FAMILY MEDICINE

## 2021-12-28 PROCEDURE — 85610 PROTHROMBIN TIME: CPT | Performed by: FAMILY MEDICINE

## 2021-12-28 NOTE — PROGRESS NOTES
[facility] [address] [place] [address]/[place]  Medication Management  ANTICOAGULATION    Referring Provider: Dr YOLANDE Hudson INR: 2.0-3.0    TODAY'S INR: 2.1    WARFARIN Dosage: continue 6mg M, 4mg all other days    INR (no units)   Date Value   2021 2.1   2021 2.5   2021 5.8   2021 2.7   2021 3.9   2021 3.5   2021 3.4       Medication changes:  No changes  AMIODARONE STARTED 21    Notes:    Fingerstick INR drawn per clinic protocol. Patient states no visible blood in urine and no black tarry stool. Denies any missed doses of warfarin. No change in other maintenance medications or in diet. Will recheck INR in 1 week. Patient acknowledges working in consult agreement with pharmacist as referred by his/her physician.                   For Pharmacy Admin Tracking Only     Intervention Detail: Adherence Monitorin   Total # of Interventions Recommended: 2   Total # of Interventions Accepted: 2   Time Spent (min): 20    NICHOLAS oBjorquez.Ph., 2021,10:00 AM

## 2022-01-03 ENCOUNTER — HOSPITAL ENCOUNTER (OUTPATIENT)
Dept: PHARMACY | Age: 82
Setting detail: THERAPIES SERIES
Discharge: HOME | End: 2022-01-03
Payer: MEDICARE

## 2022-01-03 VITALS
SYSTOLIC BLOOD PRESSURE: 115 MMHG | DIASTOLIC BLOOD PRESSURE: 83 MMHG | WEIGHT: 171.2 LBS | HEART RATE: 70 BPM | BODY MASS INDEX: 23.22 KG/M2

## 2022-01-03 DIAGNOSIS — I48.91 ATRIAL FIBRILLATION, UNSPECIFIED TYPE (HCC): Primary | ICD-10-CM

## 2022-01-03 DIAGNOSIS — Z79.01 LONG TERM CURRENT USE OF ANTICOAGULANT THERAPY: ICD-10-CM

## 2022-01-03 LAB — INR BLD: 2.7

## 2022-01-03 PROCEDURE — 99211 OFF/OP EST MAY X REQ PHY/QHP: CPT | Performed by: FAMILY MEDICINE

## 2022-01-03 PROCEDURE — 85610 PROTHROMBIN TIME: CPT | Performed by: FAMILY MEDICINE

## 2022-01-03 NOTE — PATIENT INSTRUCTIONS
Please take 6mg warfarin today then decrease your dosing to take 4mg (1 tablet) every day. Continue to monitor for signs of bleeding. Return to coumadin clinic in 2 weeks.

## 2022-01-03 NOTE — PROGRESS NOTES
[address]/[place]  Medication Management  ANTICOAGULATION    Referring Provider: Dr YOLANDE Del Castillo INR: 2.0-3.0    TODAY'S INR: 2.7    WARFARIN Dosage: continue 6mg today then decrease weekly dosage slightly to 4mg daily (AMIODARONE started 21)    INR (no units)   Date Value   2022 2.7   2021 2.1   2021 2.5   2021 5.8   2021 2.7   2021 3.9   2021 3.5       Medication changes:  No changes  AMIODARONE started 21  Notes:    Fingerstick INR drawn per clinic protocol. Patient states no visible blood in urine and no black tarry stool. Denies any missed doses of warfarin. No change in other maintenance medications or in diet. Will recheck INR in 2 weeks. Patient is having some blurred vision and eye dryness. This is a potential side effect of amiodarone. Patient is planning to follow up with Dr Ken Kendall, optometrist. Patient planning to restart cardiac rehab in the next week or so. Patient plans to travel to [facility] -March 3. Patient acknowledges working in consult agreement with pharmacist as referred by his/her physician.                   For Pharmacy Admin Tracking Only     Intervention Detail: Adherence Monitorin and Dose Adjustment: 1, reason: Therapy Optimization   Total # of Interventions Recommended: 3   Total # of Interventions Accepted: 3   Time Spent (min): 30      Rob Piper R.Ph., 1/3/2022,9:58 AM

## 2022-01-07 ENCOUNTER — HOSPITAL ENCOUNTER (OUTPATIENT)
Dept: CARDIAC REHAB | Age: 82
Discharge: HOME | End: 2022-01-07

## 2022-01-11 ENCOUNTER — HOSPITAL ENCOUNTER (OUTPATIENT)
Dept: CARDIAC REHAB | Age: 82
Discharge: HOME | End: 2022-01-11

## 2022-01-14 ENCOUNTER — HOSPITAL ENCOUNTER (OUTPATIENT)
Dept: CARDIAC REHAB | Age: 82
Discharge: HOME | End: 2022-01-14

## 2022-01-18 ENCOUNTER — HOSPITAL ENCOUNTER (OUTPATIENT)
Dept: PHARMACY | Age: 82
Setting detail: THERAPIES SERIES
Discharge: HOME | End: 2022-01-18
Payer: MEDICARE

## 2022-01-18 ENCOUNTER — HOSPITAL ENCOUNTER (OUTPATIENT)
Dept: CARDIAC REHAB | Age: 82
Discharge: HOME | End: 2022-01-18

## 2022-01-18 DIAGNOSIS — Z79.01 LONG TERM CURRENT USE OF ANTICOAGULANT THERAPY: ICD-10-CM

## 2022-01-18 DIAGNOSIS — I48.91 ATRIAL FIBRILLATION, UNSPECIFIED TYPE (HCC): Primary | ICD-10-CM

## 2022-01-18 LAB — INR BLD: 2.4

## 2022-01-18 PROCEDURE — 85610 PROTHROMBIN TIME: CPT | Performed by: FAMILY MEDICINE

## 2022-01-18 PROCEDURE — 99211 OFF/OP EST MAY X REQ PHY/QHP: CPT | Performed by: FAMILY MEDICINE

## 2022-01-18 NOTE — PATIENT INSTRUCTIONS
Continue taking warfarin 4mg every day. Continue to monitor for signs of bleeding. Return to coumadin clinic in 3 weeks.

## 2022-01-18 NOTE — PROGRESS NOTES
[facility]  Medication Management  ANTICOAGULATION    Referring Provider: Dr YOLANDE Hill INR: 2.0-3.0    TODAY'S INR: 2.4    WARFARIN Dosage: continue 4mg daily    INR (no units)   Date Value   2022 2.4   2022 2.7   2021 2.1   2021 2.5   2021 5.8   2021 2.7   2021 3.9       Medication changes:  No changes  AMIODARONE started in November     Notes:    Fingerstick INR drawn per clinic protocol. Patient states no visible blood in urine and no black tarry stool. Denies any missed doses of warfarin. No change in other maintenance medications or in diet. Will recheck INR in 3 weeks. Patient was seen by Dr Ramesh Brown about blurred vision. It was determined to be dry eyes and does not appear to be side effect of amiodarone. Patient will have follow up in 2 months with Dr Ramesh Brown. Declines BP and weight as he is in the middle of cardiac rehab during this appt and is being monitored during exercise. Patient acknowledges working in consult agreement with pharmacist as referred by his/her physician.                   For Pharmacy Admin Tracking Only     Intervention Detail: Adherence Monitorin   Total # of Interventions Recommended: 2   Total # of Interventions Accepted: 2   Time Spent (min): 15      NICHOLAS Conley.Francisca., 2022,10:57 AM

## 2022-01-21 ENCOUNTER — HOSPITAL ENCOUNTER (OUTPATIENT)
Dept: CARDIAC REHAB | Age: 82
Discharge: HOME | End: 2022-01-21

## 2022-01-25 ENCOUNTER — HOSPITAL ENCOUNTER (OUTPATIENT)
Dept: CARDIAC REHAB | Age: 82
Discharge: HOME | End: 2022-01-25

## 2022-01-28 ENCOUNTER — HOSPITAL ENCOUNTER (OUTPATIENT)
Dept: CARDIAC REHAB | Age: 82
Discharge: HOME | End: 2022-01-28

## 2022-02-01 ENCOUNTER — HOSPITAL ENCOUNTER (OUTPATIENT)
Dept: CARDIAC REHAB | Age: 82
Discharge: HOME | End: 2022-02-01

## 2022-02-01 PROCEDURE — 9900000065 HC CARDIAC REHAB PHASE 3 - 1 VISIT

## 2022-02-11 ENCOUNTER — HOSPITAL ENCOUNTER (OUTPATIENT)
Dept: CARDIAC REHAB | Age: 82
Discharge: HOME | End: 2022-02-11

## 2022-02-11 ENCOUNTER — HOSPITAL ENCOUNTER (OUTPATIENT)
Dept: PHARMACY | Age: 82
Setting detail: THERAPIES SERIES
Discharge: HOME | End: 2022-02-11
Payer: MEDICARE

## 2022-02-11 VITALS
WEIGHT: 170 LBS | BODY MASS INDEX: 23.06 KG/M2 | SYSTOLIC BLOOD PRESSURE: 123 MMHG | DIASTOLIC BLOOD PRESSURE: 84 MMHG | HEART RATE: 85 BPM

## 2022-02-11 DIAGNOSIS — Z79.01 LONG TERM CURRENT USE OF ANTICOAGULANT THERAPY: Primary | ICD-10-CM

## 2022-02-11 LAB — INR BLD: 2.1

## 2022-02-11 PROCEDURE — 99211 OFF/OP EST MAY X REQ PHY/QHP: CPT

## 2022-02-11 PROCEDURE — 85610 PROTHROMBIN TIME: CPT

## 2022-02-11 NOTE — PROGRESS NOTES
[address]/[place]  Medication Management  ANTICOAGULATION    Referring Provider: Eulalia Thao    GOAL INR: 2-3    TODAY'S INR: 2.1    WARFARIN Dosage: Continue warfarin 4 mg po daily     INR (no units)   Date Value   2022 2.1   2022 2.4   2022 2.7   2021 2.1   2021 2.5   2021 5.8   2021 2.7       Medication changes:  No changes  AMIODARONE started in November  Notes:    Fingerstick INR drawn per clinic protocol. Patient states no visible blood in urine and no black tarry stool. Denies any missed doses of warfarin. No change in other maintenance medications or in diet. Will recheck INR in 3 weeks. Patient acknowledges working in consult agreement with pharmacist as referred by his/her physician. Prescription called to [facility] (voicemail line) for warfarin 4 mg tablets - Take 1 tablet po daily or as instructed per clinic   Qty:  30 tablets   No refill   Prescriber:  Dr. Elsy Ovalles Intervention Detail: Adherence Monitorin and Refill(s) Provided   Total # of Interventions Recommended: 1   Total # of Interventions Accepted: 1   Time Spent (min): [address]  Vaishali Botello, [facility]

## 2022-02-15 ENCOUNTER — HOSPITAL ENCOUNTER (OUTPATIENT)
Dept: CARDIAC REHAB | Age: 82
Discharge: HOME | End: 2022-02-15

## 2022-02-18 ENCOUNTER — HOSPITAL ENCOUNTER (OUTPATIENT)
Dept: CARDIAC REHAB | Age: 82
Discharge: HOME | End: 2022-02-18

## 2022-02-22 ENCOUNTER — HOSPITAL ENCOUNTER (OUTPATIENT)
Dept: CARDIAC REHAB | Age: 82
Discharge: HOME | End: 2022-02-22

## 2022-02-25 ENCOUNTER — HOSPITAL ENCOUNTER (OUTPATIENT)
Dept: CARDIAC REHAB | Age: 82
Discharge: HOME | End: 2022-02-25

## 2022-03-01 ENCOUNTER — HOSPITAL ENCOUNTER (OUTPATIENT)
Dept: CARDIAC REHAB | Age: 82
Discharge: HOME | End: 2022-03-01

## 2022-03-04 ENCOUNTER — HOSPITAL ENCOUNTER (OUTPATIENT)
Dept: PHARMACY | Age: 82
Setting detail: THERAPIES SERIES
Discharge: HOME | End: 2022-03-04
Payer: MEDICARE

## 2022-03-04 ENCOUNTER — HOSPITAL ENCOUNTER (OUTPATIENT)
Dept: CARDIAC REHAB | Age: 82
Discharge: HOME | End: 2022-03-04

## 2022-03-04 VITALS
BODY MASS INDEX: 23.06 KG/M2 | SYSTOLIC BLOOD PRESSURE: 110 MMHG | WEIGHT: 170 LBS | HEART RATE: 74 BPM | DIASTOLIC BLOOD PRESSURE: 78 MMHG

## 2022-03-04 DIAGNOSIS — Z79.01 LONG TERM CURRENT USE OF ANTICOAGULANT THERAPY: Primary | ICD-10-CM

## 2022-03-04 LAB — INR BLD: 2.4

## 2022-03-04 PROCEDURE — 85610 PROTHROMBIN TIME: CPT

## 2022-03-04 PROCEDURE — 99211 OFF/OP EST MAY X REQ PHY/QHP: CPT

## 2022-03-04 NOTE — PATIENT INSTRUCTIONS
Continue to monitor urine and stool. Continue to monitor for signs of bleeding. Return to clinic in 3 weeks. Continue warfarin 4 mg tablet daily.

## 2022-03-04 NOTE — PROGRESS NOTES
[facility] [place] [address]/[place]  Medication Management  ANTICOAGULATION    Referring Provider: Stacy Prado     GOAL INR: 2-3     TODAY'S INR: 2.4     WARFARIN Dosage: Continue warfarin 4 mg tablet daily. INR (no units)   Date Value   2022 2.1   2022 2.4   2022 2.7   2021 2.1   2021 2.5   2021 5.8   2021 2.7     Medication changes:  Decreased Amiodarone to 100 mg daily 2 weeks ago    Notes:    Fingerstick INR drawn per clinic protocol. Patient states no visible blood in urine and no black tarry stool. Denies any missed doses of warfarin. No change in other maintenance medications or in diet. Will recheck INR in 3 weeks. Patient acknowledges working in consult agreement with pharmacist as referred by his/her physician.                   For Pharmacy Admin Tracking Only     Intervention Detail: Adherence Monitorin and New Rx: 1, reason: Needs Additional Therapy   Total # of Interventions Recommended: 1   Total # of Interventions Accepted: 1   Time Spent (min):  Zeke Grant, [facility], PharmD

## 2022-03-08 ENCOUNTER — HOSPITAL ENCOUNTER (OUTPATIENT)
Dept: CARDIAC REHAB | Age: 82
Discharge: HOME | End: 2022-03-08

## 2022-03-11 ENCOUNTER — HOSPITAL ENCOUNTER (OUTPATIENT)
Dept: CARDIAC REHAB | Age: 82
Discharge: HOME | End: 2022-03-11

## 2022-03-15 ENCOUNTER — HOSPITAL ENCOUNTER (OUTPATIENT)
Dept: CARDIAC REHAB | Age: 82
Discharge: HOME | End: 2022-03-15

## 2022-03-18 ENCOUNTER — HOSPITAL ENCOUNTER (OUTPATIENT)
Dept: CARDIAC REHAB | Age: 82
Discharge: HOME | End: 2022-03-18

## 2022-03-22 ENCOUNTER — HOSPITAL ENCOUNTER (OUTPATIENT)
Dept: CARDIAC REHAB | Age: 82
Discharge: HOME | End: 2022-03-22

## 2022-03-25 ENCOUNTER — HOSPITAL ENCOUNTER (OUTPATIENT)
Dept: PHARMACY | Age: 82
Setting detail: THERAPIES SERIES
Discharge: HOME | End: 2022-03-25
Payer: MEDICARE

## 2022-03-25 VITALS
HEART RATE: 70 BPM | DIASTOLIC BLOOD PRESSURE: 68 MMHG | BODY MASS INDEX: 22.51 KG/M2 | WEIGHT: 166 LBS | SYSTOLIC BLOOD PRESSURE: 115 MMHG

## 2022-03-25 DIAGNOSIS — Z79.01 LONG TERM CURRENT USE OF ANTICOAGULANT THERAPY: Primary | ICD-10-CM

## 2022-03-25 LAB — INR BLD: 2.4

## 2022-03-25 PROCEDURE — 85610 PROTHROMBIN TIME: CPT

## 2022-03-25 PROCEDURE — 99211 OFF/OP EST MAY X REQ PHY/QHP: CPT

## 2022-03-25 NOTE — PATIENT INSTRUCTIONS
Continue current dose of warfarin as instructed on dosing calendar provided - 4 mg daily. Return to clinic in 3 weeks. Continue to monitor urine and stool for signs and symptoms of bleeding. Please notify the clinic of any medication changes.

## 2022-03-25 NOTE — PROGRESS NOTES
[address] [facility]/[place]  Medication Management  ANTICOAGULATION    Referring Provider: Ramos Hernandez    GOAL INR: 2-3    TODAY'S INR: 2.4    WARFARIN Dosage: Continue warfarin 4 mg po daily    INR (no units)   Date Value   2022 2.4   2022 2.4   2022 2.1   2022 2.4   2022 2.7   2021 2.1   2021 2.5       Medication changes:  Decreased Amiodarone to 100 mg daily 5 weeks ago    Notes:    Fingerstick INR drawn per clinic protocol. Patient states no visible blood in urine and no black tarry stool. Denies any missed doses of warfarin. No change in other maintenance medications or in diet. Will recheck INR in 3 weeks. Patient acknowledges working in consult agreement with pharmacist as referred by his/her physician. For Pharmacy Admin Tracking Only     Intervention Detail: Adherence Monitorin   Total # of Interventions Recommended: 0   Total # of Interventions Accepted: 0   Time Spent (min): [address]  Shazia Borges [facility] [place], PharmD

## 2022-03-29 ENCOUNTER — HOSPITAL ENCOUNTER (OUTPATIENT)
Dept: CARDIAC REHAB | Age: 82
Discharge: HOME | End: 2022-03-29

## 2022-04-01 ENCOUNTER — HOSPITAL ENCOUNTER (OUTPATIENT)
Dept: CARDIAC REHAB | Age: 82
Discharge: HOME | End: 2022-04-01

## 2022-04-05 ENCOUNTER — HOSPITAL ENCOUNTER (OUTPATIENT)
Dept: CARDIAC REHAB | Age: 82
Discharge: HOME | End: 2022-04-05

## 2022-04-08 ENCOUNTER — HOSPITAL ENCOUNTER (OUTPATIENT)
Dept: CARDIAC REHAB | Age: 82
Discharge: HOME | End: 2022-04-08

## 2022-04-12 ENCOUNTER — HOSPITAL ENCOUNTER (OUTPATIENT)
Dept: CARDIAC REHAB | Age: 82
Discharge: HOME | End: 2022-04-12

## 2022-04-12 ENCOUNTER — HOSPITAL ENCOUNTER (OUTPATIENT)
Dept: PHARMACY | Age: 82
Setting detail: THERAPIES SERIES
Discharge: HOME | End: 2022-04-12
Payer: MEDICARE

## 2022-04-12 VITALS — BODY MASS INDEX: 23.06 KG/M2 | WEIGHT: 170 LBS

## 2022-04-12 DIAGNOSIS — Z79.01 LONG TERM CURRENT USE OF ANTICOAGULANT THERAPY: Primary | ICD-10-CM

## 2022-04-12 LAB — INR BLD: 2.8

## 2022-04-12 PROCEDURE — 99211 OFF/OP EST MAY X REQ PHY/QHP: CPT

## 2022-04-12 PROCEDURE — 85610 PROTHROMBIN TIME: CPT

## 2022-04-12 NOTE — PATIENT INSTRUCTIONS
Continue current dose of warfarin as instructed on dosing calendar provided. Return to clinic in 4 weeks. Continue to monitor urine and stool for signs and symptoms of bleeding. Please notify the clinic of any medication changes. Please remember to bring all medications (both prescription and OTC) to your next visit. Kindly notify the clinic if you are unable to make to your next appointment.

## 2022-04-12 NOTE — PROGRESS NOTES
[facility]  Medication Management  ANTICOAGULATION    Referring Provider: Tevin Paulino    GOAL INR: 2-3    TODAY'S INR: 2.8    WARFARIN Dosage: Continue warfarin 4 mg po daily    INR (no units)   Date Value   2022 2.8   2022 2.4   2022 2.4   2022 2.1   2022 2.4   2022 2.7   2021 2.1       Medication changes:  Decreased Amiodarone to 100 mg daily 8 weeks ago  Notes:    Fingerstick INR drawn per clinic protocol. Patient states no visible blood in urine and no black tarry stool. Denies any missed doses of warfarin. No change in other maintenance medications or in diet. Will recheck INR in 4 weeks. Patient acknowledges working in consult agreement with pharmacist as referred by his/her physician. For Pharmacy Admin Tracking Only     Intervention Detail: Adherence Monitorin   Total # of Interventions Recommended: 0   Total # of Interventions Accepted: 0   Time Spent (min): 280 [facility].  [facility], [address] [place] [address]

## 2022-04-15 ENCOUNTER — HOSPITAL ENCOUNTER (OUTPATIENT)
Dept: CARDIAC REHAB | Age: 82
Discharge: HOME | End: 2022-04-15

## 2022-04-19 ENCOUNTER — HOSPITAL ENCOUNTER (OUTPATIENT)
Dept: CARDIAC REHAB | Age: 82
Discharge: HOME | End: 2022-04-19

## 2022-04-22 ENCOUNTER — HOSPITAL ENCOUNTER (OUTPATIENT)
Dept: CARDIAC REHAB | Age: 82
Discharge: HOME | End: 2022-04-22

## 2022-04-26 ENCOUNTER — HOSPITAL ENCOUNTER (OUTPATIENT)
Dept: CARDIAC REHAB | Age: 82
Discharge: HOME | End: 2022-04-26

## 2022-04-29 ENCOUNTER — HOSPITAL ENCOUNTER (OUTPATIENT)
Dept: CARDIAC REHAB | Age: 82
Discharge: HOME | End: 2022-04-29

## 2022-05-03 ENCOUNTER — HOSPITAL ENCOUNTER (OUTPATIENT)
Dept: CARDIAC REHAB | Age: 82
Discharge: HOME | End: 2022-05-03

## 2022-05-06 ENCOUNTER — HOSPITAL ENCOUNTER (OUTPATIENT)
Dept: CARDIAC REHAB | Age: 82
Discharge: HOME | End: 2022-05-06

## 2022-05-10 ENCOUNTER — HOSPITAL ENCOUNTER (OUTPATIENT)
Dept: CARDIAC REHAB | Age: 82
Discharge: HOME | End: 2022-05-10

## 2022-05-13 ENCOUNTER — HOSPITAL ENCOUNTER (OUTPATIENT)
Dept: CARDIAC REHAB | Age: 82
Discharge: HOME | End: 2022-05-13

## 2022-05-13 ENCOUNTER — HOSPITAL ENCOUNTER (OUTPATIENT)
Dept: PHARMACY | Age: 82
Setting detail: THERAPIES SERIES
Discharge: HOME | End: 2022-05-13
Payer: MEDICARE

## 2022-05-13 VITALS
DIASTOLIC BLOOD PRESSURE: 76 MMHG | BODY MASS INDEX: 23.33 KG/M2 | WEIGHT: 172 LBS | HEART RATE: 70 BPM | SYSTOLIC BLOOD PRESSURE: 120 MMHG

## 2022-05-13 DIAGNOSIS — Z79.01 LONG TERM CURRENT USE OF ANTICOAGULANT THERAPY: Primary | ICD-10-CM

## 2022-05-13 LAB — INR BLD: 2.6

## 2022-05-13 PROCEDURE — 85610 PROTHROMBIN TIME: CPT

## 2022-05-13 PROCEDURE — 99211 OFF/OP EST MAY X REQ PHY/QHP: CPT

## 2022-05-13 NOTE — PATIENT INSTRUCTIONS
Continue to monitor urine and stool. Continue to monitor for signs of bleeding. Return to clinic in 4 weeks. Continue warfarin 4 mg tablet daily.

## 2022-05-13 NOTE — PROGRESS NOTES
[address] [place] [address]/[place]  Medication Management  ANTICOAGULATION    Referring Provider: Yung Barksdale    GOAL INR: 2-3    TODAY'S INR: 2.6     WARFARIN Dosage: Continue warfarin 4 mg tablet daily. INR (no units)   Date Value   2022 2.8   2022 2.4   2022 2.4   2022 2.1   2022 2.4   2022 2.7   2021 2.1     Medication changes:  None    Notes:    Fingerstick INR drawn per clinic protocol. Patient states no visible blood in urine and no black tarry stool. Denies any missed doses of warfarin. No change in other maintenance medications or in diet. Will recheck INR in 4 weeks. Patient acknowledges working in consult agreement with pharmacist as referred by his/her physician.                   For Pharmacy Admin Tracking Only     Intervention Detail: Adherence Monitorin   Total # of Interventions Recommended: 0   Total # of Interventions Accepted: 0   Time Spent (min):  Zeke Grant [facility], PharmD

## 2022-05-17 ENCOUNTER — HOSPITAL ENCOUNTER (OUTPATIENT)
Dept: CARDIAC REHAB | Age: 82
Discharge: HOME | End: 2022-05-17

## 2022-05-20 ENCOUNTER — HOSPITAL ENCOUNTER (OUTPATIENT)
Dept: CARDIAC REHAB | Age: 82
Discharge: HOME | End: 2022-05-20

## 2022-05-24 ENCOUNTER — HOSPITAL ENCOUNTER (OUTPATIENT)
Dept: CARDIAC REHAB | Age: 82
Discharge: HOME | End: 2022-05-24

## 2022-06-07 ENCOUNTER — HOSPITAL ENCOUNTER (OUTPATIENT)
Dept: CARDIAC REHAB | Age: 82
Discharge: HOME | End: 2022-06-07

## 2022-06-07 PROCEDURE — 9900000065 HC CARDIAC REHAB PHASE 3 - 1 VISIT

## 2022-06-09 ENCOUNTER — HOSPITAL ENCOUNTER (OUTPATIENT)
Dept: PHARMACY | Age: 82
Setting detail: THERAPIES SERIES
Discharge: HOME | End: 2022-06-09
Payer: MEDICARE

## 2022-06-09 VITALS
SYSTOLIC BLOOD PRESSURE: 111 MMHG | DIASTOLIC BLOOD PRESSURE: 74 MMHG | WEIGHT: 173 LBS | HEART RATE: 73 BPM | BODY MASS INDEX: 23.46 KG/M2

## 2022-06-09 DIAGNOSIS — Z79.01 LONG TERM CURRENT USE OF ANTICOAGULANT THERAPY: Primary | ICD-10-CM

## 2022-06-09 LAB — INR BLD: 2.5

## 2022-06-09 PROCEDURE — 85610 PROTHROMBIN TIME: CPT

## 2022-06-09 PROCEDURE — 99211 OFF/OP EST MAY X REQ PHY/QHP: CPT

## 2022-06-09 NOTE — PROGRESS NOTES
[address] [facility]/[place]  Medication Management  ANTICOAGULATION    Referring Provider: Chasidy Corral     GOAL INR: 2-3     TODAY'S INR: 2.5         WARFARIN Dosage: Continue warfarin 4 mg tablet daily. INR (no units)   Date Value   2022 2.6   2022 2.8   2022 2.4   2022 2.4   2022 2.1   2022 2.4   2022 2.7       Medication changes:  Increased Amiodarone back to 200 mg daily (from 100 mg daily)    Notes:    Fingerstick INR drawn per clinic protocol. Patient states no visible blood in urine and no black tarry stool. Denies any missed doses of warfarin. No change in other maintenance medications or in diet. Will recheck INR in 4 weeks. Patient acknowledges working in consult agreement with pharmacist as referred by his/her physician.                   For Pharmacy Admin Tracking Only     Intervention Detail: Adherence Monitorin and New Rx: 1, reason: Needs Additional Therapy   Total # of Interventions Recommended: 1   Total # of Interventions Accepted: 1   Time Spent (min): 2613 Zeke Grant, [facility], PharmD

## 2022-06-10 ENCOUNTER — HOSPITAL ENCOUNTER (OUTPATIENT)
Dept: CARDIAC REHAB | Age: 82
Discharge: HOME | End: 2022-06-10

## 2022-06-14 ENCOUNTER — HOSPITAL ENCOUNTER (OUTPATIENT)
Dept: CARDIAC REHAB | Age: 82
Discharge: HOME | End: 2022-06-14

## 2022-06-17 ENCOUNTER — HOSPITAL ENCOUNTER (OUTPATIENT)
Dept: CARDIAC REHAB | Age: 82
Discharge: HOME | End: 2022-06-17

## 2022-06-21 ENCOUNTER — HOSPITAL ENCOUNTER (OUTPATIENT)
Dept: CARDIAC REHAB | Age: 82
Discharge: HOME | End: 2022-06-21

## 2022-06-24 ENCOUNTER — HOSPITAL ENCOUNTER (OUTPATIENT)
Dept: CARDIAC REHAB | Age: 82
Discharge: HOME | End: 2022-06-24

## 2022-07-01 ENCOUNTER — HOSPITAL ENCOUNTER (OUTPATIENT)
Dept: CARDIAC REHAB | Age: 82
Discharge: HOME | End: 2022-07-01

## 2022-07-08 ENCOUNTER — HOSPITAL ENCOUNTER (OUTPATIENT)
Dept: PHARMACY | Age: 82
Setting detail: THERAPIES SERIES
Discharge: HOME | End: 2022-07-08
Payer: MEDICARE

## 2022-07-08 ENCOUNTER — HOSPITAL ENCOUNTER (OUTPATIENT)
Dept: CARDIAC REHAB | Age: 82
Discharge: HOME | End: 2022-07-08

## 2022-07-08 VITALS
DIASTOLIC BLOOD PRESSURE: 83 MMHG | WEIGHT: 170 LBS | HEART RATE: 67 BPM | SYSTOLIC BLOOD PRESSURE: 119 MMHG | BODY MASS INDEX: 23.06 KG/M2

## 2022-07-08 DIAGNOSIS — Z79.01 LONG TERM CURRENT USE OF ANTICOAGULANT THERAPY: Primary | ICD-10-CM

## 2022-07-08 LAB — INR BLD: 4.7

## 2022-07-08 PROCEDURE — 85610 PROTHROMBIN TIME: CPT

## 2022-07-08 PROCEDURE — 99212 OFFICE O/P EST SF 10 MIN: CPT

## 2022-07-08 NOTE — PATIENT INSTRUCTIONS
Continue to monitor urine and stool. Continue to monitor for signs of bleeding. Return to clinic in 2 weeks. Hold warfarin today and tomorrow then decrease warfarin to half tablet for 2 mg MF and whole 4 mg tablet all other days.

## 2022-07-08 NOTE — PROGRESS NOTES
[address] [place] [address]/[place]  Medication Management  ANTICOAGULATION    Referring Provider: Alberto Hollins     GOAL INR: 2-3     TODAY'S INR: 4.7         WARFARIN Dosage: Hold warfarin today and tomorrow then decrease warfarin to half tablet for 2 mg MF and whole 4 mg tablet all other days.     INR (no units)   Date Value   2022 2.5   2022 2.6   2022 2.8   2022 2.4   2022 2.4   2022 2.1   2022 2.4       Medication changes:  Lasix increase to every other day  AMIODARONE increased to 200 mg BID -which will increase INR    Notes:    Fingerstick INR drawn per clinic protocol. Patient states no visible blood in urine and no black tarry stool. Denies any missed doses of warfarin. No change in other maintenance medications or in diet. Will recheck INR in 2 weeks. Patient acknowledges working in consult agreement with pharmacist as referred by his/her physician. Patient states he's feeling fair not great. Patient fell this week and hurt his right elbow. It bled a lot and he has it wrapped in an ACE bandage. He fell getting out of bed.        For Pharmacy Admin Tracking Only     Intervention Detail: Adherence Monitorin, Dose Adjustment: 3, reason: Therapy Optimization and New Rx: 2, reason: Needs Additional Therapy   Total # of Interventions Recommended: 5   Total # of Interventions Accepted: 5   Time Spent (min): 601 [facility], [facility], PharmD

## 2022-07-12 ENCOUNTER — HOSPITAL ENCOUNTER (OUTPATIENT)
Dept: CARDIAC REHAB | Age: 82
Discharge: HOME | End: 2022-07-12

## 2022-07-22 ENCOUNTER — HOSPITAL ENCOUNTER (OUTPATIENT)
Dept: PHARMACY | Age: 82
Setting detail: THERAPIES SERIES
Discharge: HOME | End: 2022-07-22
Payer: MEDICARE

## 2022-07-22 ENCOUNTER — HOSPITAL ENCOUNTER (OUTPATIENT)
Dept: CARDIAC REHAB | Age: 82
Discharge: HOME | End: 2022-07-22

## 2022-07-22 VITALS
BODY MASS INDEX: 23.33 KG/M2 | WEIGHT: 172 LBS | HEART RATE: 71 BPM | DIASTOLIC BLOOD PRESSURE: 73 MMHG | SYSTOLIC BLOOD PRESSURE: 101 MMHG

## 2022-07-22 DIAGNOSIS — Z79.01 LONG TERM CURRENT USE OF ANTICOAGULANT THERAPY: Primary | ICD-10-CM

## 2022-07-22 LAB — INR BLD: 4.3

## 2022-07-22 PROCEDURE — 85610 PROTHROMBIN TIME: CPT

## 2022-07-22 PROCEDURE — 99212 OFFICE O/P EST SF 10 MIN: CPT

## 2022-07-22 NOTE — PROGRESS NOTES
[address]/[place]  Medication Management  ANTICOAGULATION    Referring Provider: Dr. Kim Agee INR: 2-3    TODAY'S INR: 4.3    WARFARIN Dosage: Decrease warfarin to 2 mg po every MWF; 4 mg po all other days  Patient will hold warfarin for 2 days. INR (no units)   Date Value   2022 4.3   2022 4.7   2022 2.5   2022 2.6   2022 2.8   2022 2.4   2022 2.4       Medication changes  AMIODARONE increased to 200 mg BID on 22 - which will increase INR  Notes:    Fingerstick INR drawn per clinic protocol. Patient states no visible blood in urine and no black tarry stool. Denies any missed doses of warfarin. No change in other maintenance medications or in diet. Will recheck INR in 2 weeks. Patient acknowledges working in consult agreement with pharmacist as referred by his/her physician. Patient contacted clinic for prescription refill on a weekend stating he was almost out of tablets. Prescription called to [facility] for warfarin 4 mg tablets on 22 (per [facility]) - Take 0.5 tablet (=2 mg) po every Monday and Friday and 1 tablet po all other days or as instructed per clinic    Qty:  28 day supply  No refill   Dr. Wilmer Alberto called to [address] [place] [address] ([phone]) to [facility] for warfarin 4 mg tablets - Take 0.5 tablet (=2 mg) po every MWF; 1 tablet (=4 mg) po all other days or as instructed per clinic   Qty:  72 tablets   Refill:  3   Prescriber:  Dr. Fidel Davila Only    Intervention Detail: Adherence Monitorin, Dose Adjustment: 3, reason: Therapy Optimization, and Refill(s) Provided  Total # of Interventions Recommended: 4  Total # of Interventions Accepted: 4  Time Spent (min): Robert Osorio 0110.  Phyllis Mckeon [facility]

## 2022-07-22 NOTE — PATIENT INSTRUCTIONS
Please hold warfarin tomorrow and Sunday. Decrease current dose of warfarin as instructed on dosing calendar provided - 2 mg every Monday, Wednesday and Friday and 4 mg all other days. Continue to monitor urine and stool for signs and symptoms of bleeding. Please notify the clinic of any medication changes.

## 2022-07-29 ENCOUNTER — HOSPITAL ENCOUNTER (OUTPATIENT)
Dept: CARDIAC REHAB | Age: 82
Discharge: HOME | End: 2022-07-29

## 2022-08-02 ENCOUNTER — HOSPITAL ENCOUNTER (OUTPATIENT)
Dept: CARDIAC REHAB | Age: 82
Discharge: HOME | End: 2022-08-02

## 2022-08-05 ENCOUNTER — HOSPITAL ENCOUNTER (OUTPATIENT)
Dept: PHARMACY | Age: 82
Setting detail: THERAPIES SERIES
Discharge: HOME | End: 2022-08-05
Payer: MEDICARE

## 2022-08-05 ENCOUNTER — HOSPITAL ENCOUNTER (OUTPATIENT)
Dept: CARDIAC REHAB | Age: 82
Discharge: HOME | End: 2022-08-05

## 2022-08-05 VITALS
HEART RATE: 70 BPM | SYSTOLIC BLOOD PRESSURE: 108 MMHG | WEIGHT: 171 LBS | DIASTOLIC BLOOD PRESSURE: 72 MMHG | BODY MASS INDEX: 23.19 KG/M2

## 2022-08-05 DIAGNOSIS — Z79.01 LONG TERM CURRENT USE OF ANTICOAGULANT THERAPY: Primary | ICD-10-CM

## 2022-08-05 LAB — INR BLD: 1.8

## 2022-08-05 PROCEDURE — 99211 OFF/OP EST MAY X REQ PHY/QHP: CPT

## 2022-08-05 PROCEDURE — 85610 PROTHROMBIN TIME: CPT

## 2022-08-05 NOTE — PATIENT INSTRUCTIONS
Please take warfarin 6 mg today. Continue current dose of warfarin as instructed on dosing calendar provided. Return to clinic in 1 week. Continue to monitor urine and stool for signs and symptoms of bleeding. Please notify the clinic of any medication changes.

## 2022-08-09 ENCOUNTER — HOSPITAL ENCOUNTER (OUTPATIENT)
Dept: CARDIAC REHAB | Age: 82
Discharge: HOME | End: 2022-08-09

## 2022-08-12 ENCOUNTER — HOSPITAL ENCOUNTER (OUTPATIENT)
Dept: PHARMACY | Age: 82
Setting detail: THERAPIES SERIES
Discharge: HOME | End: 2022-08-12
Payer: MEDICARE

## 2022-08-12 ENCOUNTER — HOSPITAL ENCOUNTER (OUTPATIENT)
Dept: CARDIAC REHAB | Age: 82
Discharge: HOME | End: 2022-08-12

## 2022-08-12 VITALS
BODY MASS INDEX: 23.33 KG/M2 | SYSTOLIC BLOOD PRESSURE: 116 MMHG | HEART RATE: 86 BPM | DIASTOLIC BLOOD PRESSURE: 83 MMHG | WEIGHT: 172 LBS

## 2022-08-12 DIAGNOSIS — Z79.01 LONG TERM CURRENT USE OF ANTICOAGULANT THERAPY: Primary | ICD-10-CM

## 2022-08-12 LAB — INR BLD: 3.7

## 2022-08-12 PROCEDURE — 99212 OFFICE O/P EST SF 10 MIN: CPT

## 2022-08-12 PROCEDURE — 85610 PROTHROMBIN TIME: CPT

## 2022-08-12 NOTE — PROGRESS NOTES
[address] [facility]/[place]  Medication Management  ANTICOAGULATION    Referring Provider: Dr. Will Langford INR: 2-3     TODAY'S INR: 3.7     WARFARIN Dosage: Hold warfarin today then decrease warfarin to whole 4 mg tablet MWF and half tablet 2 mg all other days. INR (no units)   Date Value   2022 1.8   2022 4.3   2022 4.7   2022 2.5   2022 2.6   2022 2.8   2022 2.4       Medication changes:  Amiodarone increased to 200 mg BID 6 weeks ago  Lasix increased from prn to 80 mg QOD    Notes:    Fingerstick INR drawn per clinic protocol. Patient states no visible blood in urine and no black tarry stool. Denies any missed doses of warfarin. No change in other maintenance medications or in diet. Will recheck INR in 1 week. Patient acknowledges working in consult agreement with pharmacist as referred by his/her physician. Patient is feeling more fatigued and is thinking about getting an ablation.               For Pharmacy Admin Tracking Only    Intervention Detail: Adherence Monitorin, Dose Adjustment: 2, reason: Therapy Optimization, and New Rx: 2, reason: Needs Additional Therapy  Total # of Interventions Recommended: 4  Total # of Interventions Accepted: 4  Time Spent (min): 601 [facility], [facility], PharmD

## 2022-08-12 NOTE — PATIENT INSTRUCTIONS
Continue to monitor urine and stool. Continue to monitor for signs of bleeding. Return to clinic in 1 week. Hold warfarin today then decrease warfarin to whole 4 mg tablet MWF and half tablet 2 mg all other days.

## 2022-08-19 ENCOUNTER — HOSPITAL ENCOUNTER (OUTPATIENT)
Dept: PHARMACY | Age: 82
Setting detail: THERAPIES SERIES
End: 2022-08-19
Payer: MEDICARE

## 2022-09-06 ENCOUNTER — HOSPITAL ENCOUNTER (OUTPATIENT)
Dept: PHARMACY | Age: 82
Setting detail: THERAPIES SERIES
Discharge: HOME | End: 2022-09-06
Payer: MEDICARE

## 2022-09-06 DIAGNOSIS — Z79.01 LONG TERM CURRENT USE OF ANTICOAGULANT THERAPY: Primary | ICD-10-CM

## 2022-09-06 LAB — INR BLD: 2

## 2022-09-06 PROCEDURE — 99211 OFF/OP EST MAY X REQ PHY/QHP: CPT

## 2022-09-06 PROCEDURE — 85610 PROTHROMBIN TIME: CPT

## 2022-09-06 RX ORDER — METOPROLOL SUCCINATE 25 MG/1
25 TABLET, EXTENDED RELEASE ORAL DAILY
COMMUNITY
Start: 2022-09-01

## 2022-09-06 RX ORDER — FUROSEMIDE 40 MG/1
40 TABLET ORAL DAILY
COMMUNITY
Start: 2022-09-01

## 2022-09-06 NOTE — PROGRESS NOTES
[address] [place] [address]/[place]  Medication Management  ANTICOAGULATION    Referring Provider: Dr. Jennifer Solis INR: 2-3     TODAY'S INR: 2.0     WARFARIN Dosage: Continue warfarin whole 4 mg tablet MWF and half tablet 2 mg all other days. INR (no units)   Date Value   2022 3.7   2022 1.8   2022 4.3   2022 4.7   2022 2.5   2022 2.6   2022 2.8     Medication changes:  Changed Lasix to 40 mg daily  Changed Toprol to 25 mg daily  Changed potassium to 10 mEq daily    Notes:    Fingerstick INR drawn per clinic protocol. Patient states no visible blood in urine and no black tarry stool. Denies any missed doses of warfarin. No change in other maintenance medications or in diet. Will recheck INR in 1 week. Patient acknowledges working in consult agreement with pharmacist as referred by his/her physician. Patient was in [place] for a week for CHF. Patient lost 4 lb of fluid.     Patient will follow up with Dr Jose Maria Kiser tomorrow morning at 10 am.        For Pharmacy Admin Tracking Only    Intervention Detail: Adherence Monitorin and New Rx: 3, reason: Needs Additional Therapy  Total # of Interventions Recommended: 1  Total # of Interventions Accepted: 1  Time Spent (min): Johnnie Montoya 11, [facility], PharmD

## 2022-09-06 NOTE — PATIENT INSTRUCTIONS
Continue to monitor urine and stool. Continue to monitor for signs of bleeding. Return to clinic in 1 week. Continue warfarin whole 4 mg tablet MWF and half tablet 2 mg all other days.

## 2022-09-13 ENCOUNTER — HOSPITAL ENCOUNTER (OUTPATIENT)
Dept: PHARMACY | Age: 82
Setting detail: THERAPIES SERIES
Discharge: HOME | End: 2022-09-13
Payer: MEDICARE

## 2022-09-13 VITALS
SYSTOLIC BLOOD PRESSURE: 91 MMHG | BODY MASS INDEX: 22.51 KG/M2 | DIASTOLIC BLOOD PRESSURE: 60 MMHG | WEIGHT: 166 LBS | HEART RATE: 71 BPM

## 2022-09-13 DIAGNOSIS — Z79.01 LONG TERM CURRENT USE OF ANTICOAGULANT THERAPY: Primary | ICD-10-CM

## 2022-09-13 LAB — INR BLD: 1.6

## 2022-09-13 PROCEDURE — 99211 OFF/OP EST MAY X REQ PHY/QHP: CPT

## 2022-09-13 PROCEDURE — 85610 PROTHROMBIN TIME: CPT

## 2022-09-13 NOTE — PROGRESS NOTES
[address] [facility]/[place]  Medication Management  ANTICOAGULATION    Referring Provider: Dr. Tiny Claude INR: 2-3    TODAY'S INR: 1.6    WARFARIN Dosage: 4 mg po every MWF; 2 mg po all other days  Patient will take warfarin 4 mg po today for 1 dose    INR (no units)   Date Value   2022 1.6   2022 2   2022 3.7   2022 1.8   2022 4.3   2022 4.7   2022 2.5       Medication changes:  No changes  Notes:    Fingerstick INR drawn per clinic protocol. Patient states no visible blood in urine and no black tarry stool. Denies any missed doses of warfarin. No change in other maintenance medications or in diet. Will recheck INR in 3 days (as he will be at hospital already for cardiac rehab evaln). Patient acknowledges working in consult agreement with pharmacist as referred by his/her physician. For Pharmacy Admin Tracking Only    Intervention Detail: Adherence Monitorin and Dose Adjustment: 1, reason: Improve Adherence, Therapy Optimization  Total # of Interventions Recommended: 2  Total # of Interventions Accepted: 2  Time Spent (min): BERNARDO/ Anu [address].  KAE Spain [facility]

## 2022-09-13 NOTE — PATIENT INSTRUCTIONS
Please take warfarin 4 mg today. Continue current dose of warfarin as instructed on dosing calendar provided. Continue to monitor urine and stool for signs and symptoms of bleeding. Please notify the clinic of any medication changes. Please remember to bring all medications (both prescription and OTC) to your next visit. Kindly notify the clinic if you are unable to make to your next appointment.

## 2022-09-16 ENCOUNTER — HOSPITAL ENCOUNTER (OUTPATIENT)
Dept: PHARMACY | Age: 82
Setting detail: THERAPIES SERIES
Discharge: HOME | End: 2022-09-16
Payer: MEDICARE

## 2022-09-16 ENCOUNTER — HOSPITAL ENCOUNTER (OUTPATIENT)
Dept: CARDIAC REHAB | Age: 82
Setting detail: THERAPIES SERIES
Discharge: HOME | End: 2022-09-16

## 2022-09-16 VITALS — SYSTOLIC BLOOD PRESSURE: 98 MMHG | DIASTOLIC BLOOD PRESSURE: 70 MMHG

## 2022-09-16 DIAGNOSIS — Z79.01 LONG TERM CURRENT USE OF ANTICOAGULANT THERAPY: Primary | ICD-10-CM

## 2022-09-16 LAB — INR BLD: 2.1

## 2022-09-16 PROCEDURE — 85610 PROTHROMBIN TIME: CPT

## 2022-09-16 PROCEDURE — 99212 OFFICE O/P EST SF 10 MIN: CPT

## 2022-09-16 NOTE — PROGRESS NOTES
[address] [place] [address]/[place]  Medication Management  ANTICOAGULATION    Referring Provider: Dr. Susan Arriaga INR: 2-3    TODAY'S INR: 2.1    WARFARIN Dosage: Increase warfarin to 2 mg po every MWF; 4 mg po all other days    INR (no units)   Date Value   2022 2.1   2022 1.6   2022 2   2022 3.7   2022 1.8   2022 4.3   2022 4.7       Medication changes:  No changes  Notes:    Fingerstick INR drawn per clinic protocol. Patient states no visible blood in urine and no black tarry stool. Denies any missed doses of warfarin. No change in other maintenance medications or in diet. Will recheck INR in 1 week. Patient acknowledges working in consult agreement with pharmacist as referred by his/her physician. Patient had cardiac rehab evaluation today prior to this appt - he will start those visits in 6 weeks. Vitals at that appointment. Patient also weighs himself daily and reports that weight is stable. For Pharmacy Admin Tracking Only    Intervention Detail: Adherence Monitorin and Dose Adjustment: 1, reason: Improve Adherence, Therapy Optimization  Total # of Interventions Recommended: 2  Total # of Interventions Accepted: 2  Time Spent (min): [address] [facility].  Tameka Cruz, [facility]

## 2022-09-16 NOTE — PROGRESS NOTES
response with exercise and in recovery (appropriate increases and decreases in HR and SBP with increasing workloads and recovery  Functional capacity >9 METs  Absence of angina or other significant symptoms (unusual shortness of breath, light-headedness, or dizziness during exercise and recovery)  Absence of complicated ventricular arrhythmias at rest  Absence of HF  Absence of signs/symptoms of post-event or post-procedure ischemia  Absence of clinical depression   Non-smoker   0-1 Uncontrolled risk factors    EXERCISE    Stages of Change:   [] pre-contemplation  [] Action   [] Contemplate    [] Maintainence   [x] Prep   [] Relapse          Exercise Prescription:  Mode: [x] TM    [x] B    [x] STP   [] R   [] UBE    Frequency: 3 days per week  Duration: 31-60 minutes  Intensity: 2-3 METs            THRR: 106-116   Progression:   Based on risk stratification, may increase duration per F.I.T.T. protocol parameters on average 5-10 minutes every 1-2 weeks for the first 4-6 weeks. After 3-4 weeks completed, continue to gradually increase F.I.T.T. parameters gradually at the established duration on average 0.5-1.0 METs per 30 days over the course of the remaining program, as established by patient centered goals and guidelines, maintaining RPE 12-16. [] Resistance Training  Introduce 8-15 bilateral upper extremity resistance exercise at 1-3 sets per lift, on 2-3 non-consecutive days using TheraBand/Free Weights/Weight machine to 8-15 reps on at lease 2 occasions, maintaining RPE 12-16. Once repetition maximum has been achieved, additional weight sets may be added. [x] Angina with Exertion    Hypertension:  [x] Yes  [] No  Resting BP: 98/70  BP Meds: Toprol XL    Intervention:  Home Exercise:  Current Exercise -    [x] Yes - Walking 2-3 times day   [] No   [] Resistance Training  Progression:  20-60 minutes of aerobic exercise on at lease 2 non-rehab days.   8-12 bilateral upper extremity resistance exercise at 1-3 sets per lift, on 2 non-consecutive days using TheraBand/Free Weights/Weight Machine to 8-15 reps on at lease 2 occasions. Once repetition maximum has been achieved, additional weight sets may be added.     Education:   [x] Equipment [place]  [] Understanding BP   [x] S/S to report  [] Sodium     [x] Warm up/ Cool down  [] Exercise Prescription   [x] RPE Scale   [] Hot/Cold weather guidelines   [] Ex Safety   [] Home Exercise     [] Proper use weights/bands    Target Goal:   -Individual Exercise Plan  -BP<120/80 or <130/80 if DM   -Aerobic active 30 + minutes 5-7 days per week    Nutrition    Stages of Change:   [] pre-contemplation  [] Action   [] Contemplate    [] Maintainence   [x] Prep    [] Relapse    Hyperlipidemia:  [x] Yes  [] No  Lab Results   Component Value Date    CHOL 117 05/03/2012     Lab Results   Component Value Date    TRIG 47 05/03/2012     Lab Results   Component Value Date    HDL 43 05/03/2012     Lab Results   Component Value Date    LDLCHOLESTEROL 65 05/03/2012     Lab Results   Component Value Date    VLDL NOT REPORTED 05/03/2012     Lab Results   Component Value Date    CHOLHDLRATIO 2.7 05/03/2012      Lipid Meds: Lipitor    Diabetes:  [] Yes  [x] No  FBS:   HbA1c:  [] Monitor BS at home   Frequency?:   Diabetes Medication:    Weight Management:  Height: 5'11  Weight: 166.8  BMI: 23.4  Wt Goal: Maintain BMI <25  Alcohol:   Social History     Substance and Sexual Activity   Alcohol Use No     Diet Assessment Tool:   [x]  Food Diary  Rate My Plate Score:   69  Special Diet:  2 gram NA+, 30% total fat, <10% saturated fat, 25-35 grams fiber, reduced cholesterol, balanced nutrition    Intervention:   [] Dietitian Consult       [] Staff/Patient Discussion     [] Referred to Diabetes Education     Education:  [] Heart Healthy Nutrition  [] Weight Management  [] Portions  [] Fat/Cholesterol  [] Food Labels       [] Food Packaging Claims  [] Snacks and Substitutes      [] Relate Medication Compliance (stated):    [x] 100%  [] 75%           [] 50%  [] 25%      [] 0%         Target Goal:  -100% medication adherence    Fall Risk Assessment:  History of falls with or without injury  [x] Yes   [] No   Use of ambulatory aid  [] Yes  [x] No   Difficulty walking/impaired gait  [x] Yes  [] No   Numbness in feet  [] Yes   [x] No   Vision changes  [x] Yes  [] No   Dizziness  [] Yes  [] No   Shortness of breath  [x] Yes  [] No   Medications  [x] Anticoagulant/Antiplatelet  [x] Betablocker /ACE/ARB  [x] Antidepressant  [] Seizure medication   [] NA  Risk of fall  [] Low (none of above)  [] Medium (less than 3 above)  [x] High (3 or more above)    Patient 90-Day Goals: (Specific, Measurable, Achievable, Relevant, and Time-Bound)  \"Increase functional capacity and strength within the next 90 days. \"      Program Goals:   Achieve and progress prescribed exercise frequency, intensity, time, and type based upon initial evaluation and submaximal graded exercise results as evidenced by the attaining and maintaining the prescribed target heart rate range, a Halina rating of perceived exertion between 11 and 16, duration of >30 - 60 minutes using multiple exercise modes for at least 3 days/week, progressing at least 0.5-1.0 METs/week as tolerated, as evidenced by daily session reports and pre/post MET levels. Introduce and progress 8-10 different bilateral UE and/or LE progressive resistance exercises focused on major muscle groups using 1-3 sets each per lift, on 2-3 non-consecutive days implementing free and machine weights and/or TheraBands, as appropriate, with a resistance of 40-60% 1-repetition maximum or 10 -15 repetitions to progressive overload and increasing resistance once repetitions have progressed to 15 reps and feel fairly light on 2 prior occasions.      Develop regular home aerobic exercise program for 20 - 60 minutes at least 2 non-rehab days per week, excluding  5 - 10 minutes warm-up and cool-down periods, as tracked on home workout log. Establish and maintain individualized heart healthy eating plan, and gradually lose 10-15 lb of body weight over the program, utilizing dietician recommendations, moderating nutrional intake, and performing regular aerobic and strength training exercises as prescribed, as evidenced by pre- and post-program nutriton survey and routine rounding with patient, food diary, and Rate-your-Plate screening survey. Strive for blood lipid optimization with an LDL-C of <100 mg/dL or  LDL 70 mg/dL, an HDL-C of > or = 40 mg/dL for men and > or = 50 mg/dL for women, and a triglyceride level of <150 mg/dL via lifestyle education, behavioral modification, and medication compliance, as evidenced by improved post-program lipid results. Strive for HbA1C <= 7.0% via lifestyle education, behavioral modification, and medication compliance as evidenced by post program HbA1C lab results. Achieve and maintain an optimal average resting blood pressure of <120 / 80 mmHg (130/80 for DM) over the course of the program by educating patient, monitoring medication compliance, introducing and maintaining regular cardiovascular exercise program as evidenced by routine BP monitoring. Minimize self-reported psycho-social feelings of stress over the program by educating patient, monitoring medication compliance, introducing and maintaining regular cardiovascular exercise as evidenced by pre- and post- surveys and by routine rounding with patient. Demonstrate knowledge about risk factor reduction, lifestyle modification, and heart health strategies with an increase of >=5% accuracy via pre- and post-program education assessment screening tool. Complete abstinence from tobacco products over the cardiac rehab program through intensive counseling, behavior modification, and medication compliance as evidenced by routine rounding with patient.     Electronically signed by Corey Vargas EP on 9/16/22 at 10:51 AM EDT    Physician Changes/Comments:

## 2022-09-16 NOTE — PROGRESS NOTES
Cardiac Rehab Initial History and Assessment    Cara Land   1940  [id number]  9/16/2022    Primary Diagnosis: CHF (EF 30-35%)  Date: 09/08/2022    Living Will: [x] Yes   [] No  On File: [x] Yes   [] No   [] N/A  Durable Power of : [x] Yes   [] No    Medical History  Past Medical History:   Diagnosis Date    Atrial fibrillation (HCC)     Bradycardia     CAD (coronary artery disease) 05/04/2012    cabg x 5     Cancer (HCC)     CHF (congestive heart failure) (Formerly [facility])     Kidney disease, chronic, stage III (GFR 30-59 ml/min) (Formerly [facility])     S/P CABG x 5        Family History  Family History   Problem Relation Age of Onset    Other Mother     Other Father     Heart Disease Father     Other Brother     Heart Disease Brother     Heart Attack Brother        Symptoms:  1. Angina   [] None   [] Tightness   [x] Shortness of Breath   [] Pressure    [] Nausea   [] Sharp, Stabbing  [] Pallor   [] Indigestion, Heartburn [] Sweaty    Where was discomfort located? Precipitating Factors? Relieved by: Resting    2. Arrhythmia   [] None   [] Irregular Beats (skips) [] Pacer    [x] Atrial Fibrillation  [] AICD    Arrhythmia Medications: Cordarone    3. Congestive Heart Failure   [] None   [] Pedal Edema  [] Unusual weight gain   [x] SOB with mild exertion [x] Fatigue    4. Vascular   [x] None   [] Peripheral claudication [] R [] L  PAD History:  Claudication History:                Location:  Pain Scale (1-5):  [] Carotid Narrowing  [] R [] L    5. Musculoskeletal    [] None   [] Back Pain  Where? [] Joint discomfort Where?      Nutrition  Appetite:  []  Too Good    [x]  Good  []  Poor  Diet: No sodium, Kidney diet, low water intake   Restaurant food 2 times/wk  Dietary Screening:  Rate Your Plate: 69  Daily Food Diary completed:       [x] Yes        [] No  Lipids:          Lab Results   Component Value Date    CHOL 117 05/03/2012     Lab Results   Component Value Date    TRIG 47 05/03/2012     Lab Results   Component Value Date    HDL 43 2012     Lab Results   Component Value Date    LDLCHOLESTEROL 65 2012     Lab Results   Component Value Date    VLDL NOT REPORTED 2012     Lab Results   Component Value Date    CHOLHDLRATIO 2.7 2012    TC     Lipid Meds: Lipitor    Alcohol:   Social History     Substance and Sexual Activity   Alcohol Use No     Caffeine: [x] Yes [] No  Type: Tea  Amount: 1 cup  Water intake per day: approx 16-24 oz    Psychological  [] Depression    [x] Anxiety    [] None  Treatment: Xanax    Tobacco Use  Social History     Tobacco Use   Smoking Status Former    Packs/day: 0.50    Years: 40.00    Pack years: 20.00    Types: Cigarettes    Quit date:     Years since quitting: 10.7   Smokeless Tobacco Never     Current smokers:  Longest quit attempt:  Tobacco triggers:  Barriers to successful cessation:  [] Smoking cessation medication:    Diabetes    [] Yes  [x] No  How Long:  How do you manage your diabetes:    Do you check your blood sugar? [] Yes  [] No  How often:  Have you seen a dietician or diabetic educator?        [] Yes  [] No      HbA1c     FBS                       Diabetic Medication:     Socio-Economic  Marital Status:      Medication Compliance (stated):    [x] 100%  [] 75%           [] 50%  [] 25%      [] 0%         Stress  Source: Not being able to do what I want do  Relaxation techniques/hobbies: Working outside    Level of Education  [] 8th Grade  [] Associates  [] Masters  [] [employer] [x] Bachelor  []  Other:    Depression Screening:  [] PHQ9 completed - score: 6    Psychosocial Screening:   [] Lisbeth and Danette Quality of Life Index scores: 24/18/30/30/30    Cardiac Rehab Pre - Test  [] Score: 80%    Physical Findings  Height: 5'11  Weight:166.8  BMI: 23.4  Weight goal: Maintain BMI <25  BP Sittin/70  BP Standin/70  Current Exercise -   [x] Yes - Walking 2-3 time a week  [] No    Cardiovascular- No edema, apical pulse regular to auscultation, strong bilateral upper and lower extremity pulses   12-Lead EKG  VR-   GA-   QRS-   QT/QTc-   Cardiac Cath-  Ejection Fraction- 30-35%  Pulmonary-   Breath Sounds:  Clear  SpO2: 94%  [] 02 use:   [] CPAP  [] BiPAP   [] Other    Neurological- No deficit noted or reported. Peripheral Vascular- No deficit noted or reported. Muscular Skeletal- No deficit noted or reported. Pain Assessment- Location/Frequency/ Duration-   Pain Level 8/10 on 10 point scale  Endocrine- Kidney disease  Gastrointestinal- No deficit noted or reported. Genitourinary- No deficit noted or reported.   Other-    Barriers to Program Adherence:  [] Transportation   [] Travel distance  [] Insurance/copay    [] Motivation  [] Other  [x] NA    Risk Stratification (of untoward event during exercise):  [x] HIGH RISK  LVEF < 40%  Survivor of cardiac arrest or sudden cardiac death  Complex ventricular arrythmias (VT >6 beats, multifocal PVCs at rest or with exercise)  Presence of angina or other significant symptoms (shortness of breath, light-headedness, or dizziness at <5 METs or during recovery  MI or cardiac surgery complicated by cardiogenic shock, CHF, and/or s/s of post-procedure ischemia  Abnormal hemodynamics with exercise, especially flat or decreasing systolic BP or chronotropic incompetence with ^ workload  Significant silent ischemia (ST depression >/= 2mm without symptoms with exercise or in recovery  Signs/symptoms including angina, dizziness, light-headedness or dyspnea with low exercise levels or in recovery  Clinically significant depression   Physically Inactive - <= 5 METs  Implantable cardioverter defibrillator (ICD)  At least 4 of below:   Lipids LDL >130 / Cholesterol > 200  BP >160/100  BMI >30  Smoker who continues to smoke  DM - HgbA1C >=7  [] MODERATE RISK  LVEF 40-49%  Mild to moderate level of silent ischemia during exercise testing or recovery (ST depression <2mm from baseline)  Presence of stable angina or other significant symptoms (unusual shortness of breath, light-headedness, or dizziness occurring only at high level of exertion [>=7 METs])  Functional capacity 5.1-8.9 METs  At least 2-3 of below:  Lipids -129 / Cholesterol 651-530  BP - systolic 715-785/[id number] 04-57  BMI >56  DM - HgbA1C 5.8-7.0  [] LOW RISK  Rest LVEF >= 50%  No resting or exercise induced complex dysrhythmias  Uncomplicate MI, CABG, angioplasty, atherectomy, or stent  Normal hemodynamic and EGC response with exercise and in recovery (appropriate increases and decreases in HR and SBP with increasing workloads and recovery  Functional capacity >9 METs  Absence of angina or other significant symptoms (unusual shortness of breath, light-headedness, or dizziness during exercise and recovery)  Absence of complicated ventricular arrhythmias at rest  Absence of HF  Absence of signs/symptoms of post-event or post-procedure ischemia  Absence of clinical depression   Non-smoker   0-1 Uncontrolled risk factors    Fall Risk Assessment:  History of falls with or without injury  [x] Yes   [] No   Use of ambulatory aid  [] Yes  [x] No   Difficulty walking/impaired gait  [x] Yes  [] No   Numbness in feet  [] Yes   [x] No   Vision changes  [] Yes  [x] No   Dizziness  [] Yes  [x] No   Shortness of breath  [x] Yes  [] No   Medications  [x] Anticoagulant/Antiplatelet  [x] Betablocker /ACE/ARB  [x] Antidepressant  [] Seizure medication   [] NA  Risk of fall  [] Low (none of above)  [] Medium (less than 3 above)  [x] High (3 or more above)       Patient 90-Day Goals: (Specific, Measurable, Achievable, Relevant, and Time-Bound)  \"Increase functional capacity and increase strength within the next 90 days. \"    Program Goals:   Achieve and progress prescribed exercise frequency, intensity, time, and type based upon initial evaluation and submaximal graded exercise results as evidenced by the attaining and maintaining the prescribed target heart rate range, a Halina rating of perceived exertion between 11 and 16, duration of >30 - 60 minutes using multiple exercise modes for at least 3 days/week, progressing at least 0.5-1.0 METs/week as tolerated, as evidenced by daily session reports and pre/post MET levels. Introduce and progress 8-10 different bilateral UE and/or LE progressive resistance exercises focused on major muscle groups using 1-3 sets each per lift, on 2-3 non-consecutive days implementing free and machine weights and/or TheraBands, as appropriate, with a resistance of 40-60% 1-repetition maximum or 10 -15 repetitions to progressive overload and increasing resistance once repetitions have progressed to 15 reps and feel fairly light on 2 prior occasions. Develop regular home aerobic exercise program for 20 - 60 minutes at least 2 non-rehab days per week, excluding  5 - 10 minutes warm-up and cool-down periods, as tracked on home workout log. Establish and maintain individualized heart healthy eating plan, and gradually lose 10-15 lb of body weight over the program, utilizing dietician recommendations, moderating nutrional intake, and performing regular aerobic and strength training exercises as prescribed, as evidenced by routine weights, pre- and post-program nutriton survey and routine rounding with patient, food diary, and Rate-your-Plate screening survey. Strive for blood lipid optimization with an LDL-C of <100 mg/dL or  LDL 70 mg/dL, an HDL-C of > or = 40 mg/dL for men and > or = 50 mg/dL for women, and a triglyceride level of <150 mg/dL via lifestyle education, behavioral modification, and medication compliance, as evidenced by improved post-program lipid results. Strive for HbA1C <= 7.0% via lifestyle education, behavioral modification, and medication compliance as evidenced by post program HbA1C lab results.      Achieve and maintain an optimal average resting blood pressure of <120 / 80 mmHg over the course of the program by educating patient, monitoring medication compliance, introducing and maintaining regular cardiovascular exercise program, as evidenced by routine BP monitoring. Minimize self-reported psycho-social feelings of stress over the program by educating patient, monitoring medication compliance, introducing and maintaining regular cardiovascular exercise as evidenced by pre- and post- surveys and by routine rounding with patient. Demonstrate knowledge about risk factor reduction, lifestyle modification, and heart health strategies with an increase of >=5% accuracy via pre- and post-program education assessment screening tool. Complete abstinence from tobacco products over the cardiac rehab program through intensive counseling, behavior modification, and medication compliance as evidenced by routine rounding with patient.     Electronically signed by RODO Stout on 9/16/22 at 10:32 AM EDT

## 2022-09-22 ENCOUNTER — HOSPITAL ENCOUNTER (OUTPATIENT)
Dept: PHARMACY | Age: 82
Setting detail: THERAPIES SERIES
Discharge: HOME | End: 2022-09-22
Payer: MEDICARE

## 2022-09-22 VITALS
DIASTOLIC BLOOD PRESSURE: 69 MMHG | BODY MASS INDEX: 22.51 KG/M2 | WEIGHT: 166 LBS | HEART RATE: 86 BPM | SYSTOLIC BLOOD PRESSURE: 96 MMHG

## 2022-09-22 DIAGNOSIS — Z79.01 LONG TERM CURRENT USE OF ANTICOAGULANT THERAPY: Primary | ICD-10-CM

## 2022-09-22 LAB — INR BLD: 2.9

## 2022-09-22 PROCEDURE — 99211 OFF/OP EST MAY X REQ PHY/QHP: CPT

## 2022-09-22 PROCEDURE — 85610 PROTHROMBIN TIME: CPT

## 2022-09-22 RX ORDER — LOTEPREDNOL ETABONATE 5 MG/ML
1 SUSPENSION/ DROPS OPHTHALMIC 2 TIMES DAILY
COMMUNITY
Start: 2022-09-19 | End: 2022-11-03 | Stop reason: ALTCHOICE

## 2022-09-22 NOTE — PROGRESS NOTES
[address]/[place]  Medication Management  ANTICOAGULATION    Referring Provider: Dr. Kamilla Vogel INR: 2-3     TODAY'S INR: 2.9     WARFARIN Dosage: Continue warfarin half tablet for 2 mg MWF and whole 4 mg tablet all other days. INR (no units)   Date Value   2022 2.1   2022 1.6   2022 2   2022 3.7   2022 1.8   2022 4.3   2022 4.7       Medication changes:  Lotemax - scratched his cornea    Notes:    Fingerstick INR drawn per clinic protocol. Patient states no visible blood in urine and no black tarry stool. Denies any missed doses of warfarin. No change in other maintenance medications or in diet. Will recheck INR in 1 week. Patient acknowledges working in consult agreement with pharmacist as referred by his/her physician.                   For Pharmacy Admin Tracking Only    Intervention Detail: Adherence Monitorin and New Rx: 1, reason: Needs Additional Therapy  Total # of Interventions Recommended: 1  Total # of Interventions Accepted: 1  Time Spent (min): Johnnie Montoya 11, [facility], PharmD

## 2022-09-22 NOTE — PATIENT INSTRUCTIONS
Continue to monitor urine and stool. Continue to monitor for signs of bleeding. Return to clinic in 1 week. Continue warfarin half tablet for 2 mg MWF and whole 4 mg tablet all other days.

## 2022-09-28 ENCOUNTER — HOSPITAL ENCOUNTER (OUTPATIENT)
Dept: GENERAL RADIOLOGY | Age: 82
Discharge: HOME | End: 2022-09-30
Payer: MEDICARE

## 2022-09-28 ENCOUNTER — HOSPITAL ENCOUNTER (OUTPATIENT)
Age: 82
Discharge: HOME | End: 2022-09-30
Payer: MEDICARE

## 2022-09-28 DIAGNOSIS — M25.552 LEFT HIP PAIN: ICD-10-CM

## 2022-09-28 DIAGNOSIS — R10.2 VAGINAL PAIN: ICD-10-CM

## 2022-09-28 DIAGNOSIS — S70.02XA HEMATOMA OF LEFT HIP, INITIAL ENCOUNTER: ICD-10-CM

## 2022-09-28 PROCEDURE — 73502 X-RAY EXAM HIP UNI 2-3 VIEWS: CPT

## 2022-09-30 ENCOUNTER — HOSPITAL ENCOUNTER (OUTPATIENT)
Dept: PHARMACY | Age: 82
Setting detail: THERAPIES SERIES
Discharge: HOME | End: 2022-09-30
Payer: MEDICARE

## 2022-09-30 VITALS
BODY MASS INDEX: 22.92 KG/M2 | DIASTOLIC BLOOD PRESSURE: 64 MMHG | SYSTOLIC BLOOD PRESSURE: 87 MMHG | HEART RATE: 87 BPM | WEIGHT: 169 LBS

## 2022-09-30 DIAGNOSIS — Z79.01 LONG TERM CURRENT USE OF ANTICOAGULANT THERAPY: Primary | ICD-10-CM

## 2022-09-30 LAB — INR BLD: 5.3

## 2022-09-30 PROCEDURE — 99211 OFF/OP EST MAY X REQ PHY/QHP: CPT

## 2022-09-30 PROCEDURE — 85610 PROTHROMBIN TIME: CPT

## 2022-09-30 NOTE — PATIENT INSTRUCTIONS
Please hold warfarin today, tomorrow and Sunday. Continue current dose of warfarin as instructed on dosing calendar provided. Continue to monitor urine and stool for signs and symptoms of bleeding. Please notify the clinic of any medication changes.

## 2022-10-03 ENCOUNTER — HOSPITAL ENCOUNTER (OUTPATIENT)
Dept: PHARMACY | Age: 82
Setting detail: THERAPIES SERIES
Discharge: HOME | End: 2022-10-03
Payer: MEDICARE

## 2022-10-03 VITALS
BODY MASS INDEX: 22.78 KG/M2 | WEIGHT: 168 LBS | SYSTOLIC BLOOD PRESSURE: 86 MMHG | DIASTOLIC BLOOD PRESSURE: 61 MMHG | HEART RATE: 92 BPM

## 2022-10-03 DIAGNOSIS — Z79.01 LONG TERM CURRENT USE OF ANTICOAGULANT THERAPY: Primary | ICD-10-CM

## 2022-10-03 LAB — INR BLD: 2.4

## 2022-10-03 PROCEDURE — 99212 OFFICE O/P EST SF 10 MIN: CPT

## 2022-10-03 PROCEDURE — 85610 PROTHROMBIN TIME: CPT

## 2022-10-03 NOTE — PROGRESS NOTES
[address] [facility]/[place]  Medication Management  ANTICOAGULATION    Referring Provider: Heidi De Anda    GOAL INR: 2-3    TODAY'S INR: 2.4    WARFARIN Dosage:  Decrease warfarin to 4 mg po every Monday; 2 mg po all other days      INR (no units)   Date Value   10/03/2022 2.4   2022 5.3   2022 2.9   2022 2.1   2022 1.6   2022 2   2022 3.7       Medication changes:  No changes  Notes:    Fingerstick INR drawn per clinic protocol. Patient states no visible blood in urine and no black tarry stool. Denies any missed doses of warfarin. No change in other maintenance medications or in diet. Will recheck INR in 3 days. Patient acknowledges working in consult agreement with pharmacist as referred by his/her physician. Patient held warfarin for the past 3 days due to supra-therapeutic INR of 5.3 on Friday, 22. INR is in therapeutic range today at 2.4. Patient's last 7 day total of warfarin was 12 mg. Will decrease warfarin as above (next 7 day total 16 mg) and recheck INR in 3 days. For Pharmacy Admin Tracking Only    Intervention Detail: Adherence Monitorin and Dose Adjustment: 1, reason: Improve Adherence, Therapy Optimization  Total # of Interventions Recommended: 2  Total # of Interventions Accepted: 2  Time Spent (min): BERNARDO/ Anu Smart.  Alexandra Mail, [address] [facility]

## 2022-10-03 NOTE — PATIENT INSTRUCTIONS
Please take 4 mg today, 2 mg tomorrow and 2 mg on Wednesday. Continue current dose of warfarin as instructed on dosing calendar provided. Continue to monitor urine and stool for signs and symptoms of bleeding. Please notify the clinic of any medication changes.

## 2022-10-06 ENCOUNTER — HOSPITAL ENCOUNTER (OUTPATIENT)
Dept: PHARMACY | Age: 82
Setting detail: THERAPIES SERIES
Discharge: HOME | End: 2022-10-06
Payer: MEDICARE

## 2022-10-06 VITALS — BODY MASS INDEX: 22.78 KG/M2 | WEIGHT: 168 LBS

## 2022-10-06 DIAGNOSIS — Z79.01 LONG TERM CURRENT USE OF ANTICOAGULANT THERAPY: Primary | ICD-10-CM

## 2022-10-06 LAB — INR BLD: 2.4

## 2022-10-06 PROCEDURE — 85610 PROTHROMBIN TIME: CPT

## 2022-10-06 PROCEDURE — 99211 OFF/OP EST MAY X REQ PHY/QHP: CPT

## 2022-10-06 NOTE — PROGRESS NOTES
[address] [place] [address]/[place]  Medication Management  ANTICOAGULATION    Referring Provider: Sarath Izquierdo     GOAL INR: 2-3     TODAY'S INR: 2.4     WARFARIN Dosage:  Continue warfarin 4 mg Monday and half tablet for 2 mg  all other days. INR (no units)   Date Value   10/03/2022 2.4   2022 5.3   2022 2.9   2022 2.1   2022 1.6   2022 2   2022 3.7       Medication changes:  None    Notes:    Fingerstick INR drawn per clinic protocol. Patient states no visible blood in urine and no black tarry stool. Denies any missed doses of warfarin. No change in other maintenance medications or in diet. Will recheck INR in 1 week. Patient acknowledges working in consult agreement with pharmacist as referred by his/her physician.                   For Pharmacy Admin Tracking Only    Intervention Detail: Adherence Monitorin  Total # of Interventions Recommended: 0  Total # of Interventions Accepted: 0  Time Spent (min): Johnnie Montoya 79, [address] [facility], PharmD

## 2022-10-06 NOTE — PATIENT INSTRUCTIONS
Continue to monitor urine and stool. Continue to monitor for signs of bleeding. Return to clinic in 1 week. Continue warfarin 4 mg Monday and half tablet for 2 mg  all other days.

## 2022-10-13 ENCOUNTER — HOSPITAL ENCOUNTER (OUTPATIENT)
Dept: PHARMACY | Age: 82
Setting detail: THERAPIES SERIES
Discharge: HOME | End: 2022-10-13
Payer: MEDICARE

## 2022-10-13 VITALS
SYSTOLIC BLOOD PRESSURE: 101 MMHG | WEIGHT: 173.2 LBS | DIASTOLIC BLOOD PRESSURE: 74 MMHG | HEART RATE: 88 BPM | BODY MASS INDEX: 23.49 KG/M2

## 2022-10-13 DIAGNOSIS — I48.91 ATRIAL FIBRILLATION, UNSPECIFIED TYPE (HCC): Primary | ICD-10-CM

## 2022-10-13 DIAGNOSIS — Z79.01 LONG TERM CURRENT USE OF ANTICOAGULANT THERAPY: ICD-10-CM

## 2022-10-13 LAB — INR BLD: 3.3

## 2022-10-13 PROCEDURE — 99211 OFF/OP EST MAY X REQ PHY/QHP: CPT | Performed by: FAMILY MEDICINE

## 2022-10-13 PROCEDURE — 85610 PROTHROMBIN TIME: CPT | Performed by: FAMILY MEDICINE

## 2022-10-13 NOTE — PATIENT INSTRUCTIONS
Please do not take warfarin today then return to your regular dosing of 4mg (1 tablet) Monday and 2mg (1/2 tablet) all other days. Continue to monitor for signs of bleeding. Return to coumadin clinic in 10 days.

## 2022-10-13 NOTE — PROGRESS NOTES
[address]/[place]  Medication Management  ANTICOAGULATION    Referring Provider: Dr YOLANDE Moore INR: 2.0-3.0    TODAY'S INR: 3.3    WARFARIN Dosage: hold x1 then 4mg M, 2mg all other days    INR (no units)   Date Value   10/13/2022 3.3   10/06/2022 2.4   10/03/2022 2.4   2022 5.3   2022 2.9   2022 2.1   2022 1.6       Medication changes:  Decreased amiodarone to 200mg DAILY on 10/11    Notes:    Fingerstick INR drawn per clinic protocol. Patient states no visible blood in urine and no black tarry stool. Denies any missed doses of warfarin. No change in other maintenance medications or in diet. Will recheck INR in 10 days. Patient's weight is increased from last visit; patient is wearing a jacket and coat as well. Daily weight checks every morning at home. Patient takes furosemide 40mg daily and additional 40mg for weight of 166lb or greater. Patient is having Influenza vaccine on 10/21/22. Patient had diarrhea from Wednesday through  last week. Patient did not eat greens during that time. Patient will hold warfarin today then take 4mg M, 2mg all other days. We will monitor closely for needed adjustments due to amiodarone dose decrease. Currently holding cardiac rehab (for at least this week). Patient acknowledges working in consult agreement with pharmacist as referred by his/her physician.                   For Pharmacy Admin Tracking Only    Intervention Detail: Adherence Monitorin and Dose Adjustment: 1, reason: Therapy De-escalation  Total # of Interventions Recommended: 3  Total # of Interventions Accepted: 3  Time Spent (min): 30    NICHOLAS Lopez.Francisca., 10/13/2022,12:21 PM

## 2022-10-17 ENCOUNTER — HOSPITAL ENCOUNTER (OUTPATIENT)
Dept: CARDIAC REHAB | Age: 82
Discharge: HOME | End: 2022-10-17

## 2022-10-24 ENCOUNTER — HOSPITAL ENCOUNTER (OUTPATIENT)
Dept: PHARMACY | Age: 82
Setting detail: THERAPIES SERIES
Discharge: HOME | End: 2022-10-24
Payer: MEDICARE

## 2022-10-24 VITALS
HEART RATE: 71 BPM | WEIGHT: 165 LBS | BODY MASS INDEX: 22.38 KG/M2 | SYSTOLIC BLOOD PRESSURE: 93 MMHG | DIASTOLIC BLOOD PRESSURE: 62 MMHG

## 2022-10-24 DIAGNOSIS — Z79.01 LONG TERM CURRENT USE OF ANTICOAGULANT THERAPY: Primary | ICD-10-CM

## 2022-10-24 LAB — INR BLD: 2.6

## 2022-10-24 PROCEDURE — 99211 OFF/OP EST MAY X REQ PHY/QHP: CPT

## 2022-10-24 PROCEDURE — 85610 PROTHROMBIN TIME: CPT

## 2022-10-24 NOTE — PROGRESS NOTES
[address]/[place]  Medication Management  ANTICOAGULATION    Referring Provider: Dr. Thomas Springer INR: 2-3    TODAY'S INR: 2.6    WARFARIN Dosage: Continue warfarin 4 mg po every Monday and 2 mg po all other days    INR (no units)   Date Value   10/24/2022 2.6   10/13/2022 3.3   10/06/2022 2.4   10/03/2022 2.4   2022 5.3   2022 2.9   2022 2.1       Medication changes:  DECREASE AMIODARONE to 200 mg po daily on 10/11/22  Allopurinol on hold until 22 - until follow up with Dr. Ariel Wallace    Notes:    Fingerstick INR drawn per clinic protocol. Patient states no visible blood in urine and no black tarry stool. Denies any missed doses of warfarin. No change in other maintenance medications or in diet. Will recheck INR in 10 days. Patient acknowledges working in consult agreement with pharmacist as referred by his/her physician. For Pharmacy Admin Tracking Only    Intervention Detail: Adherence Monitorin  Total # of Interventions Recommended: 1  Total # of Interventions Accepted: 1  Time Spent (min): BERNARDO/ [address].  Aj Lopez, [facility]

## 2022-10-24 NOTE — PATIENT INSTRUCTIONS
Continue current dose of warfarin as instructed on dosing calendar provided. Return to clinic in 10 days. Continue to monitor urine and stool for signs and symptoms of bleeding. Please notify the clinic of any medication changes.

## 2022-11-03 ENCOUNTER — HOSPITAL ENCOUNTER (OUTPATIENT)
Dept: PHARMACY | Age: 82
Setting detail: THERAPIES SERIES
Discharge: HOME | End: 2022-11-03
Payer: MEDICARE

## 2022-11-03 VITALS
BODY MASS INDEX: 22.35 KG/M2 | DIASTOLIC BLOOD PRESSURE: 71 MMHG | HEART RATE: 72 BPM | WEIGHT: 164.8 LBS | SYSTOLIC BLOOD PRESSURE: 105 MMHG

## 2022-11-03 DIAGNOSIS — I48.91 ATRIAL FIBRILLATION, UNSPECIFIED TYPE (HCC): Primary | ICD-10-CM

## 2022-11-03 DIAGNOSIS — Z79.01 LONG TERM CURRENT USE OF ANTICOAGULANT THERAPY: ICD-10-CM

## 2022-11-03 LAB — INR BLD: 2.4

## 2022-11-03 PROCEDURE — 85610 PROTHROMBIN TIME: CPT | Performed by: FAMILY MEDICINE

## 2022-11-03 PROCEDURE — 99211 OFF/OP EST MAY X REQ PHY/QHP: CPT | Performed by: FAMILY MEDICINE

## 2022-11-03 RX ORDER — LEVOTHYROXINE SODIUM 175 UG/1
175 TABLET ORAL DAILY
COMMUNITY

## 2022-11-03 NOTE — PROGRESS NOTES
[facility]/[place]  Medication Management  ANTICOAGULATION    Referring Provider: Dr Thomas Springer INR: 2.0-3.0    TODAY'S INR: 2.4    WARFARIN Dosage: continue 4mg M, 2mg all other days    INR (no units)   Date Value   2022 2.4   10/24/2022 2.6   10/13/2022 3.3   10/06/2022 2.4   10/03/2022 2.4   2022 5.3   2022 2.9       Medication changes:  Changed levothyroxine from 150mcg to 175mcg daily last week  AMIODARONE decreased to 200mg po DAILY on 10/11/22  Continue to hold allopurinol until appt with Dr Ariel Wallace on 22    Notes:    Fingerstick INR drawn per clinic protocol. Patient states no visible blood in urine and no black tarry stool. Denies any missed doses of warfarin. No change in other maintenance medications or in diet. Will recheck INR in 10 days. Patient and wife, Amado Beckett, present for visit. Patient is not eating very well due to appetite. Patient has had a can of Boost yesterday and this morning. They will try to implement 1 can daily over the coming week. We will continue to monitor closely for dietary changes as well as the interaction with amiodarone. Patient acknowledges working in consult agreement with pharmacist as referred by his/her physician.                   For Pharmacy Admin Tracking Only    Intervention Detail: Adherence Monitorin  Total # of Interventions Recommended: 2  Total # of Interventions Accepted: 2  Time Spent (min):  25    Arleen Connolly R.Ph., 11/3/2022,3:38 PM

## 2022-11-03 NOTE — PATIENT INSTRUCTIONS
Continue to take warfarin 4mg (1 tablet) on Mondays and 2mg (1/2 tablet) all other days. Continue to monitor for signs of bleeding. Return to coumadin clinic in 1 1/2 weeks.

## 2022-11-15 ENCOUNTER — HOSPITAL ENCOUNTER (OUTPATIENT)
Dept: PHARMACY | Age: 82
Setting detail: THERAPIES SERIES
Discharge: HOME | End: 2022-11-15
Payer: MEDICARE

## 2022-11-15 VITALS
BODY MASS INDEX: 21.18 KG/M2 | WEIGHT: 156.2 LBS | DIASTOLIC BLOOD PRESSURE: 73 MMHG | SYSTOLIC BLOOD PRESSURE: 111 MMHG | HEART RATE: 69 BPM

## 2022-11-15 DIAGNOSIS — Z79.01 LONG TERM CURRENT USE OF ANTICOAGULANT THERAPY: ICD-10-CM

## 2022-11-15 DIAGNOSIS — I48.91 ATRIAL FIBRILLATION, UNSPECIFIED TYPE (HCC): Primary | ICD-10-CM

## 2022-11-15 LAB — INR BLD: 1.7

## 2022-11-15 PROCEDURE — 99212 OFFICE O/P EST SF 10 MIN: CPT | Performed by: FAMILY MEDICINE

## 2022-11-15 PROCEDURE — 85610 PROTHROMBIN TIME: CPT | Performed by: FAMILY MEDICINE

## 2022-11-15 NOTE — PATIENT INSTRUCTIONS
Please take 1 tablet (4mg) today then increase your dosing to take warfarin 4mg (1 tablet) on Monday and Friday and 2mg (1/2 tablet) all other days. Continue to monitor for signs of bleeding. Return to  coumadin clinic in 1 week.

## 2022-11-15 NOTE — PROGRESS NOTES
[address]  Medication Management  ANTICOAGULATION    Referring Provider: Dr YOLANDE Nguyen INR: 2.0-3.0    TODAY'S INR: 1.7    WARFARIN Dosage: 4mg x1 then increase to 2mg MF, 2mg all other days    INR (no units)   Date Value   11/15/2022 1.7   2022 2.4   10/24/2022 2.6   10/13/2022 3.3   10/06/2022 2.4   10/03/2022 2.4   2022 5.3       Medication changes:  No changes  AMIODARONE DECREASED 10/11/22 to 200mg DAILY  Drinking 1 can of Boost daily    Notes:    Fingerstick INR drawn per clinic protocol. Patient states no visible blood in urine and no black tarry stool. Denies any missed doses of warfarin. No change in other maintenance medications or in diet. Will recheck INR in 1 week. Patient and wife, Jannet Boss, indicate they saw Dr Godwin Day and he is concerned that his thyroid levels are off-\"one of the numbers (T3 or T4) is high and the other is low\". Dr Godwin Day has referred Cesilia Cuba to see Dr Mame Gamboa, endocrinologist for this matter; they are awaiting a call from his office to schedule initial visit. Patient started Boost 1 can daily on  and continues at this time. Patient is feeling well and has \"had a good week\"-able to move around the house without walker or cane as well as getting dressed on own and eating better. Patient decreased amiodarone to 200mg on 10/11 as well. The combination of Boost as well as the delayed effects of amiodarone dose change are likely contributing to his subtherapeutic INR today. Patient will take 4mg warfarin today then increase weekly dosage 12% to 4mg MF, 2mg all other days. Patient acknowledges working in consult agreement with pharmacist as referred by his/her physician.                   For Pharmacy Admin Tracking Only    Intervention Detail: Adherence Monitorin  Dose adjustment: 2  Total # of Interventions Recommended: 4  Total # of Interventions Accepted: 4  Time Spent (min): 30      Fermín Reyes R.Ph., 11/15/2022,4:09 PM

## 2022-11-22 ENCOUNTER — HOSPITAL ENCOUNTER (OUTPATIENT)
Dept: PHARMACY | Age: 82
Setting detail: THERAPIES SERIES
Discharge: HOME | End: 2022-11-22
Payer: MEDICARE

## 2022-11-22 VITALS
BODY MASS INDEX: 21.29 KG/M2 | WEIGHT: 157 LBS | SYSTOLIC BLOOD PRESSURE: 90 MMHG | HEART RATE: 69 BPM | DIASTOLIC BLOOD PRESSURE: 62 MMHG

## 2022-11-22 DIAGNOSIS — Z79.01 LONG TERM CURRENT USE OF ANTICOAGULANT THERAPY: Primary | ICD-10-CM

## 2022-11-22 LAB — INR BLD: 2

## 2022-11-22 PROCEDURE — 85610 PROTHROMBIN TIME: CPT

## 2022-11-22 PROCEDURE — 99211 OFF/OP EST MAY X REQ PHY/QHP: CPT

## 2022-11-22 NOTE — PROGRESS NOTES
[facility] [place] [address]/[place]  Medication Management  ANTICOAGULATION    Referring Provider: Dr Cal Barbosa INR: 2-3    TODAY'S INR: 2    WARFARIN Dosage: Continue warfarin 4 mg po every Monday and Friday; 2 mg po all other days    INR (no units)   Date Value   2022 2   11/15/2022 1.7   2022 2.4   10/24/2022 2.6   10/13/2022 3.3   10/06/2022 2.4   10/03/2022 2.4       Medication changes:  No changes  AMIODARONE DECREASED 10/11/22 to 200mg DAILY  Drinking 1 can of Boost daily    Notes:    Fingerstick INR drawn per clinic protocol. Patient states no visible blood in urine and no black tarry stool. Denies any missed doses of warfarin. No change in other maintenance medications or in diet. Will recheck INR in 2 weeks. Patient acknowledges working in consult agreement with pharmacist as referred by his/her physician. For Pharmacy Admin Tracking Only    Intervention Detail: Adherence Monitorin  Total # of Interventions Recommended: 1  Total # of Interventions Accepted: 1  Time Spent (min): 618 [facility]  Albaro Kowalski

## 2022-12-06 ENCOUNTER — APPOINTMENT (OUTPATIENT)
Dept: CT IMAGING | Age: 82
End: 2022-12-06
Payer: MEDICARE

## 2022-12-06 ENCOUNTER — APPOINTMENT (OUTPATIENT)
Dept: GENERAL RADIOLOGY | Age: 82
End: 2022-12-06
Payer: MEDICARE

## 2022-12-06 ENCOUNTER — HOSPITAL ENCOUNTER (EMERGENCY)
Age: 82
Discharge: ANOTHER HOSPITAL | End: 2022-12-07
Attending: EMERGENCY MEDICINE
Payer: MEDICARE

## 2022-12-06 DIAGNOSIS — S72.001A CLOSED FRACTURE OF RIGHT HIP, INITIAL ENCOUNTER (HCC): Primary | ICD-10-CM

## 2022-12-06 DIAGNOSIS — I50.9 ACUTE ON CHRONIC CONGESTIVE HEART FAILURE, UNSPECIFIED HEART FAILURE TYPE (HCC): ICD-10-CM

## 2022-12-06 LAB
ABSOLUTE EOS #: 0.2 K/UL (ref 0–0.44)
ABSOLUTE IMMATURE GRANULOCYTE: 0.06 K/UL (ref 0–0.3)
ABSOLUTE LYMPH #: 1.34 K/UL (ref 1.1–3.7)
ABSOLUTE MONO #: 0.75 K/UL (ref 0.1–1.2)
ALBUMIN SERPL-MCNC: 3.7 G/DL (ref 3.5–5.2)
ALBUMIN/GLOBULIN RATIO: 0.9 (ref 1–2.5)
ALP BLD-CCNC: 99 U/L (ref 40–129)
ALT SERPL-CCNC: 32 U/L (ref 5–41)
ANION GAP SERPL CALCULATED.3IONS-SCNC: 11 MMOL/L (ref 9–17)
AST SERPL-CCNC: 42 U/L
BASOPHILS # BLD: 1 % (ref 0–2)
BASOPHILS ABSOLUTE: 0.05 K/UL (ref 0–0.2)
BILIRUB SERPL-MCNC: 1.5 MG/DL (ref 0.3–1.2)
BILIRUBIN URINE: NEGATIVE
BUN BLDV-MCNC: 41 MG/DL (ref 8–23)
BUN/CREAT BLD: 24 (ref 9–20)
CALCIUM SERPL-MCNC: 9.3 MG/DL (ref 8.6–10.4)
CASTS UA: ABNORMAL /LPF
CHLORIDE BLD-SCNC: 97 MMOL/L (ref 98–107)
CO2: 29 MMOL/L (ref 20–31)
COLOR: YELLOW
CREAT SERPL-MCNC: 1.68 MG/DL (ref 0.7–1.2)
EOSINOPHILS RELATIVE PERCENT: 3 % (ref 1–4)
EPITHELIAL CELLS UA: ABNORMAL /HPF (ref 0–5)
GFR SERPL CREATININE-BSD FRML MDRD: 40 ML/MIN/1.73M2
GLUCOSE BLD-MCNC: 114 MG/DL (ref 70–99)
GLUCOSE URINE: NEGATIVE
HCT VFR BLD CALC: 38.5 % (ref 40.7–50.3)
HEMOGLOBIN: 12.7 G/DL (ref 13–17)
IMMATURE GRANULOCYTES: 1 %
INR BLD: 2.8
KETONES, URINE: NEGATIVE
LEUKOCYTE ESTERASE, URINE: NEGATIVE
LYMPHOCYTES # BLD: 20 % (ref 24–43)
MCH RBC QN AUTO: 33.8 PG (ref 25.2–33.5)
MCHC RBC AUTO-ENTMCNC: 33 G/DL (ref 28.4–34.8)
MCV RBC AUTO: 102.4 FL (ref 82.6–102.9)
MONOCYTES # BLD: 11 % (ref 3–12)
NITRITE, URINE: NEGATIVE
NRBC AUTOMATED: 0 PER 100 WBC
PARTIAL THROMBOPLASTIN TIME: 39.7 SEC (ref 26.8–34.8)
PDW BLD-RTO: 16.1 % (ref 11.8–14.4)
PH UA: 6 (ref 5–9)
PLATELET # BLD: 234 K/UL (ref 138–453)
PMV BLD AUTO: 9.1 FL (ref 8.1–13.5)
POTASSIUM SERPL-SCNC: 4.3 MMOL/L (ref 3.7–5.3)
PRO-BNP: 5674 PG/ML
PROTEIN UA: NEGATIVE
PROTHROMBIN TIME: 29.3 SEC (ref 11.5–14.2)
RBC # BLD: 3.76 M/UL (ref 4.21–5.77)
RBC UA: ABNORMAL /HPF (ref 0–2)
SARS-COV-2, RAPID: NOT DETECTED
SEG NEUTROPHILS: 64 % (ref 36–65)
SEGMENTED NEUTROPHILS ABSOLUTE COUNT: 4.24 K/UL (ref 1.5–8.1)
SODIUM BLD-SCNC: 137 MMOL/L (ref 135–144)
SPECIFIC GRAVITY UA: 1.02 (ref 1.01–1.02)
SPECIMEN DESCRIPTION: NORMAL
TOTAL PROTEIN: 7.9 G/DL (ref 6.4–8.3)
TURBIDITY: CLEAR
URINE HGB: ABNORMAL
UROBILINOGEN, URINE: NORMAL
WBC # BLD: 6.6 K/UL (ref 3.5–11.3)
WBC UA: ABNORMAL /HPF (ref 0–5)

## 2022-12-06 PROCEDURE — 85730 THROMBOPLASTIN TIME PARTIAL: CPT

## 2022-12-06 PROCEDURE — 6360000002 HC RX W HCPCS: Performed by: EMERGENCY MEDICINE

## 2022-12-06 PROCEDURE — 83880 ASSAY OF NATRIURETIC PEPTIDE: CPT

## 2022-12-06 PROCEDURE — 87086 URINE CULTURE/COLONY COUNT: CPT

## 2022-12-06 PROCEDURE — 73090 X-RAY EXAM OF FOREARM: CPT

## 2022-12-06 PROCEDURE — 72125 CT NECK SPINE W/O DYE: CPT

## 2022-12-06 PROCEDURE — 99285 EMERGENCY DEPT VISIT HI MDM: CPT

## 2022-12-06 PROCEDURE — 85025 COMPLETE CBC W/AUTO DIFF WBC: CPT

## 2022-12-06 PROCEDURE — 96375 TX/PRO/DX INJ NEW DRUG ADDON: CPT

## 2022-12-06 PROCEDURE — 72170 X-RAY EXAM OF PELVIS: CPT

## 2022-12-06 PROCEDURE — 71045 X-RAY EXAM CHEST 1 VIEW: CPT

## 2022-12-06 PROCEDURE — 51702 INSERT TEMP BLADDER CATH: CPT

## 2022-12-06 PROCEDURE — 87635 SARS-COV-2 COVID-19 AMP PRB: CPT

## 2022-12-06 PROCEDURE — 73080 X-RAY EXAM OF ELBOW: CPT

## 2022-12-06 PROCEDURE — 85610 PROTHROMBIN TIME: CPT

## 2022-12-06 PROCEDURE — 36415 COLL VENOUS BLD VENIPUNCTURE: CPT

## 2022-12-06 PROCEDURE — 73502 X-RAY EXAM HIP UNI 2-3 VIEWS: CPT

## 2022-12-06 PROCEDURE — 73060 X-RAY EXAM OF HUMERUS: CPT

## 2022-12-06 PROCEDURE — 70450 CT HEAD/BRAIN W/O DYE: CPT

## 2022-12-06 PROCEDURE — 81001 URINALYSIS AUTO W/SCOPE: CPT

## 2022-12-06 PROCEDURE — 96374 THER/PROPH/DIAG INJ IV PUSH: CPT

## 2022-12-06 PROCEDURE — 80053 COMPREHEN METABOLIC PANEL: CPT

## 2022-12-06 RX ORDER — FENTANYL CITRATE 50 UG/ML
25 INJECTION, SOLUTION INTRAMUSCULAR; INTRAVENOUS ONCE
Status: COMPLETED | OUTPATIENT
Start: 2022-12-06 | End: 2022-12-06

## 2022-12-06 RX ORDER — ATORVASTATIN CALCIUM 40 MG/1
40 TABLET, FILM COATED ORAL DAILY
COMMUNITY

## 2022-12-06 RX ORDER — ORPHENADRINE CITRATE 100 MG/1
100 TABLET, EXTENDED RELEASE ORAL 2 TIMES DAILY
COMMUNITY

## 2022-12-06 RX ORDER — FUROSEMIDE 40 MG/1
40 TABLET ORAL 2 TIMES DAILY
COMMUNITY

## 2022-12-06 RX ORDER — ALLOPURINOL 100 MG/1
100 TABLET ORAL DAILY
COMMUNITY

## 2022-12-06 RX ORDER — METOPROLOL SUCCINATE 25 MG/1
25 TABLET, EXTENDED RELEASE ORAL DAILY
COMMUNITY

## 2022-12-06 RX ORDER — FUROSEMIDE 10 MG/ML
40 INJECTION INTRAMUSCULAR; INTRAVENOUS ONCE
Status: COMPLETED | OUTPATIENT
Start: 2022-12-06 | End: 2022-12-06

## 2022-12-06 RX ORDER — TRAMADOL HYDROCHLORIDE 50 MG/1
50 TABLET ORAL EVERY 6 HOURS PRN
COMMUNITY

## 2022-12-06 RX ORDER — ALPRAZOLAM 0.5 MG/1
0.5 TABLET ORAL NIGHTLY PRN
COMMUNITY

## 2022-12-06 RX ORDER — MULTIVIT-MIN/FA/LYCOPEN/LUTEIN .4-300-25
1 TABLET ORAL DAILY
COMMUNITY

## 2022-12-06 RX ORDER — POTASSIUM CHLORIDE 750 MG/1
10 CAPSULE, EXTENDED RELEASE ORAL 2 TIMES DAILY
COMMUNITY

## 2022-12-06 RX ORDER — LEVOTHYROXINE SODIUM 175 UG/1
175 TABLET ORAL DAILY
COMMUNITY

## 2022-12-06 RX ORDER — DOCUSATE SODIUM 100 MG/1
100 CAPSULE, LIQUID FILLED ORAL 2 TIMES DAILY
COMMUNITY

## 2022-12-06 RX ORDER — CLOPIDOGREL BISULFATE 75 MG/1
75 TABLET ORAL DAILY
COMMUNITY

## 2022-12-06 RX ORDER — AMIODARONE HYDROCHLORIDE 200 MG/1
200 TABLET ORAL DAILY
COMMUNITY

## 2022-12-06 RX ADMIN — FENTANYL CITRATE 25 MCG: 50 INJECTION, SOLUTION INTRAMUSCULAR; INTRAVENOUS at 22:39

## 2022-12-06 RX ADMIN — FUROSEMIDE 40 MG: 10 INJECTION, SOLUTION INTRAMUSCULAR; INTRAVENOUS at 22:39

## 2022-12-06 ASSESSMENT — PAIN DESCRIPTION - LOCATION: LOCATION: LEG

## 2022-12-06 ASSESSMENT — PAIN SCALES - GENERAL
PAINLEVEL_OUTOF10: 3
PAINLEVEL_OUTOF10: 8

## 2022-12-07 VITALS
TEMPERATURE: 98.4 F | HEIGHT: 73 IN | OXYGEN SATURATION: 88 % | SYSTOLIC BLOOD PRESSURE: 111 MMHG | BODY MASS INDEX: 20.01 KG/M2 | DIASTOLIC BLOOD PRESSURE: 81 MMHG | WEIGHT: 151 LBS | RESPIRATION RATE: 19 BRPM | HEART RATE: 76 BPM

## 2022-12-07 NOTE — ED PROVIDER NOTES
[facility] ED  EMERGENCY DEPARTMENT ENCOUNTER      Pt Name: Jamey Cortez  MRN: [id number]  [place] 1940  Date of evaluation: 12/6/2022  Provider: Stuart Andujar MD    [address] [place] [address]       Chief Complaint   Patient presents with    Elvera Sparkle forward out of wheelchair PTA hitting dresser, no known LOC, large hematoma noted to right elbow and forehead, unknown blood thinners, c/o right hip pain, shortened and externally right leg noted on triage    Altered Mental Status     Per squad report, increased AMS and confusion over last several days         HISTORY OF PRESENT ILLNESS      Jamey Cortez is a 79 y.o. male who presents to the emergency department for evaluation after a fall. Reportedly fell forward out of his wheelchair, striking his head and his right elbow. Presents complaining of right elbow pain and right hip pain. Family reports that he has been on some \"pain medication and muscle relaxers\" after being diagnosed with a thoracic compression fracture recently. They feel he has been somewhat more confused over the last few days, more notable today. He apparently tried to perform some activity himself by getting out of the wheelchair when he fell. No LOC. On arrival he complains only of right hip pain if the hip is touched. Has minimal right elbow pain. No other pain complaints. Denies any chest pain or dyspnea. No nausea or vomiting. No acute vision changes. No headache. Denies any abdominal pain. No left lower extremity pain. No left arm pain. No urinary complaints. No other complaints at this time. Anticoagulated on Coumadin for history of A. fib. REVIEW OF SYSTEMS       As in HPI.       PAST MEDICAL HISTORY     Past Medical History:   Diagnosis Date    Atrial fibrillation [facility])     CAD (coronary artery disease)     Cancer (HCC)     CHF (congestive heart failure) (HCC)     Chronic kidney disease     Hyperlipidemia     Hypertension     Kidney stone Thyroid disease          SURGICAL HISTORY       Past Surgical History:   Procedure Laterality Date    APPENDECTOMY      CARDIAC DEFIBRILLATOR PLACEMENT      CARDIAC PACEMAKER PLACEMENT      CARDIAC SURGERY      EYE SURGERY      TONSILLECTOMY           CURRENT MEDICATIONS       Previous Medications    ALLOPURINOL (ZYLOPRIM) 100 MG TABLET    Take 100 mg by mouth daily    ALPRAZOLAM (XANAX) 0.5 MG TABLET    Take 0.5 mg by mouth nightly as needed for Sleep. AMIODARONE (CORDARONE) 200 MG TABLET    Take 200 mg by mouth daily    ATORVASTATIN (LIPITOR) 40 MG TABLET    Take 40 mg by mouth daily    CLOPIDOGREL (PLAVIX) 75 MG TABLET    Take 75 mg by mouth daily    COENZYME Q10 (COQ-10) 100 MG CAPS    Take 100 mg by mouth 2 times daily    DOCUSATE SODIUM (COLACE) 100 MG CAPSULE    Take 100 mg by mouth 2 times daily    FUROSEMIDE (LASIX) 40 MG TABLET    Take 40 mg by mouth in the morning and 40 mg in the evening. IRON POLYSACCH [id number]--0.025-1 MG TABS    Take by mouth    LEVOTHYROXINE (SYNTHROID) 175 MCG TABLET    Take 175 mcg by mouth Daily    METOPROLOL SUCCINATE (TOPROL XL) 25 MG EXTENDED RELEASE TABLET    Take 25 mg by mouth daily    MULTIPLE VITAMINS-MINERALS (CENTRUM SILVER ADULT 50+) TABS    Take 1 tablet by mouth daily    ORPHENADRINE (NORFLEX) 100 MG EXTENDED RELEASE TABLET    Take 100 mg by mouth 2 times daily    POTASSIUM CHLORIDE (MICRO-K) 10 MEQ EXTENDED RELEASE CAPSULE    Take 10 mEq by mouth 2 times daily    TRAMADOL (ULTRAM) 50 MG TABLET    Take 50 mg by mouth every 6 hours as needed for Pain. ALLERGIES       Patient has no known allergies.       SOCIAL HISTORY       Social History     Tobacco Use    Smoking status: Former     Packs/day: 1.00     Years: 20.00     Pack years: 20.00     Types: Cigarettes    Smokeless tobacco: Never   Substance Use Topics    Drug use: Never         PHYSICAL EXAM       ED Triage Vitals   BP Temp Temp Source Heart Rate Resp SpO2 Height Weight   12/06/22 2123 12/06/22 2342 12/06/22 2342 12/06/22 2125 12/06/22 2125 12/06/22 2125 12/06/22 2149 12/06/22 2149   130/84 98.4 °F (36.9 °C) Oral 70 12 92 % 6' 1\" (1.854 m) 151 lb (68.5 kg)       Physical Exam  Vitals reviewed. Constitutional:       General: He is not in acute distress. Appearance: He is not ill-appearing, toxic-appearing or diaphoretic. HENT:      Head:      Comments: Abrasion just above the forehead. No laceration. No underlying crepitus. Otherwise, atraumatic. Mouth/Throat:      Comments: OM slightly dry but clear. No visible intraoral or perioral trauma. Eyes:      Comments: Left eye strabismus, chronic. No acute vision changes, right. No visible periorbital trauma. Cardiovascular:      Rate and Rhythm: Normal rate and regular rhythm. Heart sounds: Murmur (2/6 ZULY) heard. Pulmonary:      Effort: Pulmonary effort is normal. No respiratory distress. Breath sounds: No stridor. No wheezing, rhonchi or rales. Chest:      Chest wall: No tenderness. Abdominal:      General: There is no distension. Palpations: Abdomen is soft. There is no mass. Tenderness: There is no abdominal tenderness. There is no guarding. Musculoskeletal:      Cervical back: Neck supple. No rigidity. Comments: No C/T/L-spine tenderness to palpation, step-off or deformity. There is tenderness to palpation over the right elbow with forming hematoma but no laceration. Full range of motion without crepitus. Mild tenderness to the right lateral upper arm, again without deformity or crepitus. Right shoulder and remainder of the right upper extremity is unremarkable. Left upper extremity demonstrates no evident injury. Tenderness to the right lateral hip but pelvis is stable. Remainder of bilateral lower extremities are unremarkable. Lymphadenopathy:      Cervical: No cervical adenopathy. Skin:     General: Skin is warm. Coloration: Skin is not cyanotic or pale.    Neurological: Mental Status: He is alert. GCS: GCS eye subscore is 4. GCS verbal subscore is 5. GCS motor subscore is 6. Cranial Nerves: No dysarthria or facial asymmetry. Psychiatric:         Mood and Affect: Mood normal.         Behavior: Behavior normal.       DIAGNOSTIC RESULTS     RADIOLOGY:     Interpretation per the Radiologist below, if available at the time of this note:    XR HIP RIGHT (2-3 VIEWS)   Final Result   Intertrochanteric right femoral fracture in varus position with mild medial   displacement of the major distal fracture fragment. XR HUMERUS RIGHT (MIN 2 VIEWS)   Final Result   No acute fracture dislocation involving the right humerus or elbow. Mild degenerative osteophytes at the elbow and glenohumeral joint. XR ELBOW RIGHT (MIN 3 VIEWS)   Final Result   No acute fracture dislocation involving the right humerus or elbow. Mild degenerative osteophytes at the elbow and glenohumeral joint. XR RADIUS ULNA RIGHT (2 VIEWS)   Final Result   No acute fracture or dislocation. Vascular calcifications. XR CHEST PORTABLE   Final Result   Findings suggesting congestive heart failure with mild interstitial edema. XR PELVIS (1-2 VIEWS)   Final Result   Nondisplaced intertrochanteric right femoral fracture in varus position. CT CERVICAL SPINE WO CONTRAST   Final Result   No acute cervical spine fracture. Advanced multilevel spinal degenerative changes. CT Head WO Contrast   Final Result   No acute intracranial abnormality. Volume loss and chronic microvascular ischemic changes.                LABS:  Labs Reviewed   CBC WITH AUTO DIFFERENTIAL - Abnormal; Notable for the following components:       Result Value    RBC 3.76 (*)     Hemoglobin 12.7 (*)     Hematocrit 38.5 (*)     MCH 33.8 (*)     RDW 16.1 (*)     Lymphocytes 20 (*)     Immature Granulocytes 1 (*)     All other components within normal limits   COMPREHENSIVE METABOLIC PANEL - Abnormal; Notable for the following components:    Glucose 114 (*)     BUN 41 (*)     Creatinine 1.68 (*)     Est, Glom Filt Rate 40 (*)     Bun/Cre Ratio 24 (*)     Chloride 97 (*)     AST 42 (*)     Total Bilirubin 1.5 (*)     Albumin/Globulin Ratio 0.9 (*)     All other components within normal limits   APTT - Abnormal; Notable for the following components:    PTT 39.7 (*)     All other components within normal limits   PROTIME-INR - Abnormal; Notable for the following components:    Protime 29.3 (*)     All other components within normal limits   URINALYSIS WITH MICROSCOPIC - Abnormal; Notable for the following components:    Urine Hgb TRACE (*)     All other components within normal limits   BRAIN NATRIURETIC PEPTIDE - Abnormal; Notable for the following components:    Pro-BNP 5,674 (*)     All other components within normal limits   COVID-19, RAPID   CULTURE, URINE       All other labs were within normal range or not returned as of this dictation. EMERGENCY DEPARTMENT COURSE and DIFFERENTIAL DIAGNOSIS/MDM:     Patient arrived in the ED for evaluation after a mechanical fall. Awake, alert. No distress. Hemodynamically stable. Noted to be slightly hypoxic, though responsive to 2 L of nasal cannula O2. Pulmonary exam is unremarkable and there is no suggestion of injury to the chest.    Small abrasion to the head but no laceration or other injuries noted. Right elbow pain with forming hematoma. Right hip pain with the right lower extremity shortened and externally rotated concerning for fracture. CT head and C-spine are unremarkable. X-rays of the right upper extremity unremarkable. Right intertrochanteric fracture noted on x-ray. P/M/S to the right lower extremity is intact. Clinically, I suspect associated mild congestive heart failure and have given the patient Lasix. No further management required at this time. He has no complaints related to dyspnea or chest pain.   No further evaluation warranted in the ED setting. The patient has a history of significant, advanced congestive heart failure and is a high risk operative case. He would not be appropriate for consideration of hip arthroplasty at this facility. As he has been at [facility] in the recent past, and they have managed his heart failure, the family would prefer he return to [facility]. This was discussed with the transfer center nursing staff at [facility], who has accepted the patient on behalf of Dr. Robe Poole for the trauma service. Patient is pending transfer at this time. Remains hemodynamically stable with pain controlled. FINAL IMPRESSION      1. Closed fracture of right hip, initial encounter ([facility] [address]    2.  Acute on chronic congestive heart failure, unspecified heart failure type [facility])          DISPOSITION/PLAN     DISPOSITION Decision To Transfer 12/06/2022 10:26:29 PM        (Please note that portions of this note were completed with a voice recognition program.  Efforts were made to edit the dictations but occasionally words are mis-transcribed.)    Nando Jose MD (electronically signed)  Attending Emergency Physician            Nando Jose MD  12/07/22 [id number]

## 2022-12-08 LAB
CULTURE: NO GROWTH
SPECIMEN DESCRIPTION: NORMAL

## 2023-01-03 NOTE — PROGRESS NOTES
[facility]  Medication Management  ANTICOAGULATION    Referring Doctor: Janny Ng INR: 2-3    TODAY'S INR: 4.3    WARFARIN Dosage: Hold warfarin today then take half tablet for 2 mg tomorrow then decrease dosage to 2 tablets for 8 mg on Mon, Fri and 1.5 tablets all other days. INR (no units)   Date Value   01/04/2021 4.3   11/23/2020 2.7   10/12/2020 2.4   08/31/2020 2.5   07/27/2020 2   06/29/2020 2.8   05/29/2020 2.7       Hemoglobin (g/dL)   Date Value   07/31/2019 13.2   05/14/2012 9.0 (L)   05/08/2012 8.7 (L)     Hematocrit (%)   Date Value   07/31/2019 41.6   05/14/2012 27.3 (L)   05/08/2012 25.8 (L)     Platelet Count (k/uL)   Date Value   05/14/2012 589 (H)   05/08/2012 211   05/06/2012 134 (L)     Platelets (k/uL)   Date Value   07/31/2019 219     Notes:    Fingerstick INR drawn per clinic protocol. Patient states no visible blood in urine and no black tarry stool. Denies any missed doses of warfarin. No change in other maintenance medications or in diet. Will recheck INR in 1.5 weeks. Patient acknowledges working in consult agreement with pharmacist as referred by his/her physician. No medication changes or diet or alcohol. CLINICAL PHARMACY CONSULT: MED RECONCILIATION/REVIEW ADDENDUM    For Pharmacy Admin Tracking Only    PHSO: No  Total # of Interventions Recommended: 3  - Decreased Dose #: 3  - Maintenance Safety Lab Monitoring #: 1  Recommended intervention potential cost savings:   Accepted intervention potential cost savings:    Total Interventions Accepted: 4  Time Spent (min): 30    Joan Venegas PharmD Face Mask

## 2023-01-09 ENCOUNTER — ANTI-COAG VISIT (OUTPATIENT)
Dept: PHARMACY | Age: 83
End: 2023-01-09

## 2023-01-09 DIAGNOSIS — Z79.01 LONG TERM CURRENT USE OF ANTICOAGULANT THERAPY: Primary | ICD-10-CM

## 2023-01-09 NOTE — PROGRESS NOTES
Per post it note on 22 from [place], [place] patient is in inpatient hospice in [place]. Discharged patient. Faxed discharge letter to Dr Leopold Massing. Resolved episode.     For Pharmacy Admin Tracking Only    Intervention Detail: Adherence Monitorin  Total # of Interventions Recommended: 1  Total # of Interventions Accepted: 1  Time Spent (min): 20    Joselyn Tapia, PharmD 2023 12:18 PM

## 2023-01-09 NOTE — LETTER
?             Date:  23    Provider: Dr Elena Ascencio    Re: [address] Agreement    Patient Name: Cricket Valencia    : 1940    Thank you for allowing us to participate in the care of your patient. Due to the reason indicated above we, ([facility]), will no longer be sharing with you the responsibility of managing your patients anticoagulation therapy. This notice will serve as a termination of the consult agreement for this patient. If applicable to compliance, your patient has been notified prior to this termination through three letters requesting follow-up. These letters may be accessed through the medical record if needed. We appreciate your referrals and thank you for allowing us to participate in the care of your patient. Should you have any questions or concerns please feel free to contact our office at [phone]. Sincerely,   _________________________________  NICHOLAS Henderson. Ph.  Director, Medication Management  [facility] Immanuel Soliman, PharmD  Clinic Pharmacist    [place], [place]. Ph.  Clinic Pharmacist    [place], [place]. Ph  Clinic Pharmacist

## 2024-07-26 NOTE — PROGRESS NOTES
Quality 431: Preventive Care And Screening: Unhealthy Alcohol Use - Screening: Patient not identified as an unhealthy alcohol user when screened for unhealthy alcohol use using a systematic screening method
[facility] [address] [place] [address]/[place]  Medication Management  ANTICOAGULATION    Referring Provider: Dr. Aury Cuevas INR: 2-3    TODAY'S INR: 5.3    WARFARIN Dosage: 2 mg po every MWF; 4 mg po all other days    Patient will hold warfarin for the next 3 days - return to clinic on Monday, 10/3/22, to recheck INR    INR (no units)   Date Value   09/30/2022 5.3   09/22/2022 2.9   09/16/2022 2.1   09/13/2022 1.6   09/06/2022 2   08/12/2022 3.7   08/05/2022 1.8       Medication changes:  Tylenol 500 mg PRN for pain - taking approximately 500 mg daily since fall     Notes:    Fingerstick INR drawn per clinic protocol. Patient states no visible blood in urine and no black tarry stool. Denies any missed doses of warfarin. No change in other maintenance medications or in diet. Will recheck INR in 3 days. Patient acknowledges working in consult agreement with pharmacist as referred by his/her physician. Patient had a fall. Large bruise on left hip. X ray 9/28/22 negative for fracture. Patient has 2-3 pound weight gain. He is eating less likely due to pain from fall and also taking about 500 mg of Tylenol daily for pain. For Pharmacy Admin Tracking Only    Intervention Detail: Dose Adjustment: 3, reason: Therapy Optimization  Total # of Interventions Recommended: 3  Total # of Interventions Accepted: 3  Time Spent (min): NAS[facility] [address].  [place], [facility]
Quality 130: Documentation Of Current Medications In The Medical Record: Current Medications Documented
Detail Level: Detailed
Quality 47: Advance Care Plan: Advance Care Planning discussed and documented; advance care plan or surrogate decision maker documented in the medical record.
Quality 226: Preventive Care And Screening: Tobacco Use: Screening And Cessation Intervention: Tobacco Screening not Performed

## 2025-07-15 NOTE — PROGRESS NOTES
Problem: Pain - Adult  Goal: Verbalizes/displays adequate comfort level or baseline comfort level  Outcome: Progressing     Problem: Safety - Adult  Goal: Free from fall injury  Outcome: Progressing     Problem: Discharge Planning  Goal: Discharge to home or other facility with appropriate resources  Outcome: Progressing     Problem: Chronic Conditions and Co-morbidities  Goal: Patient's chronic conditions and co-morbidity symptoms are monitored and maintained or improved  Outcome: Progressing     Problem: Nutrition  Goal: Nutrient intake appropriate for maintaining nutritional needs  Outcome: Progressing     Problem: Skin  Goal: Decreased wound size/increased tissue granulation at next dressing change  Outcome: Progressing  Goal: Participates in plan/prevention/treatment measures  Outcome: Progressing  Goal: Prevent/manage excess moisture  Outcome: Progressing  Goal: Prevent/minimize sheer/friction injuries  Outcome: Progressing  Goal: Promote/optimize nutrition  Outcome: Progressing  Goal: Promote skin healing  Outcome: Progressing   The patient's goals for the shift include      The clinical goals for the shift include Pt will remain free from fall/injury throughout this shift.    Over the shift, the patient did not make progress toward the following goals. Barriers to progression include confusion/sundowning. Recommendations to address these barriers include medication/re-direction.     Patient states no visible blood in urine and no black tarry stool. No change in other medications. Will return to clinic in 6 weeks. Patient will take 8 mg of warfarin today. Patient will be going on a cruise until January 23.